# Patient Record
Sex: FEMALE | Race: WHITE | NOT HISPANIC OR LATINO
[De-identification: names, ages, dates, MRNs, and addresses within clinical notes are randomized per-mention and may not be internally consistent; named-entity substitution may affect disease eponyms.]

---

## 2017-02-01 ENCOUNTER — RX ONLY (OUTPATIENT)
Age: 82
Setting detail: RX ONLY
End: 2017-02-01

## 2017-12-13 ENCOUNTER — OFFICE VISIT (OUTPATIENT)
Dept: MEDICAL GROUP | Facility: PHYSICIAN GROUP | Age: 82
End: 2017-12-13
Payer: MEDICARE

## 2017-12-13 VITALS
HEIGHT: 62 IN | WEIGHT: 132 LBS | SYSTOLIC BLOOD PRESSURE: 116 MMHG | DIASTOLIC BLOOD PRESSURE: 60 MMHG | RESPIRATION RATE: 16 BRPM | OXYGEN SATURATION: 95 % | HEART RATE: 75 BPM | BODY MASS INDEX: 24.29 KG/M2 | TEMPERATURE: 97 F

## 2017-12-13 DIAGNOSIS — Z85.3 HISTORY OF BREAST CANCER: ICD-10-CM

## 2017-12-13 DIAGNOSIS — E78.00 HIGH CHOLESTEROL: ICD-10-CM

## 2017-12-13 DIAGNOSIS — F41.9 ANXIETY: ICD-10-CM

## 2017-12-13 DIAGNOSIS — E03.9 ACQUIRED HYPOTHYROIDISM: Primary | ICD-10-CM

## 2017-12-13 DIAGNOSIS — I10 ESSENTIAL HYPERTENSION: ICD-10-CM

## 2017-12-13 DIAGNOSIS — Z23 NEED FOR VACCINATION: ICD-10-CM

## 2017-12-13 PROCEDURE — 99204 OFFICE O/P NEW MOD 45 MIN: CPT | Mod: 25 | Performed by: NURSE PRACTITIONER

## 2017-12-13 PROCEDURE — 90670 PCV13 VACCINE IM: CPT | Performed by: NURSE PRACTITIONER

## 2017-12-13 PROCEDURE — 90472 IMMUNIZATION ADMIN EACH ADD: CPT | Performed by: NURSE PRACTITIONER

## 2017-12-13 PROCEDURE — G0009 ADMIN PNEUMOCOCCAL VACCINE: HCPCS | Performed by: NURSE PRACTITIONER

## 2017-12-13 PROCEDURE — 90715 TDAP VACCINE 7 YRS/> IM: CPT | Performed by: NURSE PRACTITIONER

## 2017-12-13 RX ORDER — PHENOL 1.4 %
AEROSOL, SPRAY (ML) MUCOUS MEMBRANE
COMMUNITY
End: 2022-04-20

## 2017-12-13 ASSESSMENT — PATIENT HEALTH QUESTIONNAIRE - PHQ9: CLINICAL INTERPRETATION OF PHQ2 SCORE: 0

## 2017-12-13 NOTE — PROGRESS NOTES
[facility]  Primary Care Office Visit - Problem-Oriented        History:     Cortney Sullivan is a 86 y.o. female who is here today to discuss Hypertension      History of breast cancer  Patient does have a history of breast cancer, she is status post left breast lumpectomy and radiation 5 years ago, she does regular self breast exam, she has not found any new or concerning masses.   Mammogram last year and normal.    Hypertension  The patient is an 86-year-old female with hypertension, she continues on hydrochlorothiazide and lovastatin daily without consultation. She denies chest pain, shortness of breath, change in vision, headaches. We will check labs and asked that she follow-up in 6 months.    Hypothyroidism  Patient is an 86-year-old female who is new to me and here to establish care. She does have hypothyroidism and continues on levothyroxine 50 µg daily. She is due for labs.    High cholesterol  Patient is an 86-year-old female with hyperlipidemia, she does continue on statin without complication, she is due for labs. We did discuss diet and exercise.    Anxiety  Patient is an 86 regular female who is new to me and her to establish care. She does report episodic panic attacks for which she uses Ativan 0.5 mg, she has not used this in nearly 6 months.        Past Medical History:   Diagnosis Date   • CAD (coronary artery disease)    • Cancer (CMS-HCC)     breast   • High cholesterol    • Hypertension    • Hypothyroidism    • Pancreatitis, acute     resolved     Past Surgical History:   Procedure Laterality Date   • LUMPECTOMY      & radiation only     Social History     Social History   • Marital status: Unknown     Spouse name: N/A   • Number of children: N/A   • Years of education: N/A     Occupational History   • Not on file.     Social History Main Topics   • Smoking status: Former Smoker     Types: Cigarettes   • Smokeless tobacco: Never Used      Comment: 10 pk/yr hs   • Alcohol use Yes       "Comment: 4-8 oz/day   • Drug use: No   • Sexual activity: Yes     Partners: Male     Other Topics Concern   • Not on file     Social History Narrative   • No narrative on file     History   Smoking Status   • Former Smoker   • Types: Cigarettes   Smokeless Tobacco   • Never Used     Comment: 10 pk/yr hs     Family History   Problem Relation Age of Onset   • Dementia Brother      No Known Allergies    Problem List:     Patient Active Problem List    Diagnosis Date Noted   • Anxiety 12/13/2017   • History of breast cancer 12/13/2017   • Hypertension    • High cholesterol    • Hypothyroidism          Medications:     Current Outpatient Prescriptions:   •  aspirin 81 MG tablet, Take 81 mg by mouth every day., Disp: , Rfl:   •  Melatonin 10 MG Tab, Take  by mouth., Disp: , Rfl:   •  lovastatin (MEVACOR) 40 MG tablet, Take 1 Tab by mouth every day. Pt must establish with a new provider, Disp: 90 Tab, Rfl: 0  •  levothyroxine (SYNTHROID) 50 MCG TABS, Take 1 Tab by mouth every day. PATIENT NEEDS TO ESTABLISH WITH A NEW PROVIDER, Disp: 90 Tab, Rfl: 0  •  TEKTURNA 300 MG tablet, TAKE ONE TABLET BY MOUTH EVERY DAY PATIENT  IS  DUE  FOR  ANNUAL  LABS  BEFORE  ADDITIONAL  REFILLS, Disp: 90 Each, Rfl: 3  •  hydrochlorothiazide (HYDRODIURIL) 25 MG TABS, Take 1 Tab by mouth every day., Disp: 90 Each, Rfl: 1  •  Lactobacillus (ACIDOPHOLUS PO), Take  by mouth. [id number] , Disp: , Rfl:   •  lorazepam (ATIVAN) 0.5 MG TABS, Take 0.5 mg by mouth 3 times a day as needed., Disp: , Rfl:   •  albuterol (VENTOLIN OR PROVENTIL) 108 (90 BASE) MCG/ACT AERS, Inhale 2 Puffs by mouth every 6 hours as needed., Disp: , Rfl:       Review of Systems:     Pertinent positives as per HPI, all other systems reviewed and WNL     Physical Assessment:     VS: /60   Pulse 75   Temp 36.1 °C (97 °F)   Resp 16   Ht 1.575 m (5' 2\")   Wt 59.9 kg (132 lb)   SpO2 95%   BMI 24.14 kg/m²     General: Well-developed, well-nourished, female     Head: PERRL, " EOMI. Normocephalic. No facial asymmetry noted.  Neck: Neck supple, no thyromegaly  Cardiovasc:No JVD.  RRR, no MRG. No thrills or bruits. Pulses 2+ and symmetric at all distal extremities.  Pulmonary: Lungs clear bilaterally.  Normal respiratory effort. No wheeze or crackles.   Neuro: Alert and oriented, CNs II-XII intact, no focal deficits.  Skin:No rashes noted. Skin warm, dry, intact    Psych: Dressed appropriately for the weather, pleasant and conversant.  Affect, mood & judgment appropriate.    Assessment/Plan:   Cortney was seen today for hypertension.    Diagnoses and all orders for this visit:    Acquired hypothyroidism, chronic, unclear control  - Continue levothyroxine, check labs, follow-up in 6 months, sooner if needed  -     TSH; Future  -     FREE THYROXINE; Future    Anxiety, stable without treatment    History of breast cancer    High cholesterol, chronic, unclear control  - Discussed dietary modifications, check labs, continue statin, follow-up in 6 months, sooner if needed  -     LIPID PROFILE; Future  -     COMP METABOLIC PANEL; Future    Need for vaccination  -     TDAP VACCINE =>6YO IM  -     PNEUMOCOCCAL CONJUGATE VACCINE 13-VALENT    Essential hypertension, chronic, stable  - Continue current treatment, consider decreasing losartan and discontinue hydrochlorothiazide given low blood pressure, patient is asymptomatic and clinically stable we will continue to monitor. Check labs. Follow up in 6 months, sooner if needed.      Patient is agreeable to the above plan and voiced understanding. All questions answered.     Please note that this dictation was created using voice recognition software. I have made every reasonable attempt to correct obvious errors, but I expect that there are errors of grammar and possibly content that I did not discover before finalizing the note.      LENI Gonzalez  12/13/2017, 2:51 PM

## 2017-12-13 NOTE — ASSESSMENT & PLAN NOTE
The patient is an 86-year-old female with hypertension, she continues on hydrochlorothiazide and lovastatin daily without consultation. She denies chest pain, shortness of breath, change in vision, headaches. We will check labs and asked that she follow-up in 6 months.

## 2017-12-13 NOTE — ASSESSMENT & PLAN NOTE
Patient is an 86-year-old female with hyperlipidemia, she does continue on statin without complication, she is due for labs. We did discuss diet and exercise.

## 2017-12-13 NOTE — ASSESSMENT & PLAN NOTE
Patient is an 86-year-old female who is new to me and here to establish care. She does have hypothyroidism and continues on levothyroxine 50 µg daily. She is due for labs.

## 2017-12-13 NOTE — ASSESSMENT & PLAN NOTE
Patient is an 86 regular female who is new to me and her to establish care. She does report episodic panic attacks for which she uses Ativan 0.5 mg, she has not used this in nearly 6 months.

## 2017-12-13 NOTE — ASSESSMENT & PLAN NOTE
Patient does have a history of breast cancer, she is status post left breast lumpectomy and radiation 5 years ago, she does regular self breast exam, she has not found any new or concerning masses.   Mammogram last year and normal.

## 2018-03-17 ENCOUNTER — OFFICE VISIT (OUTPATIENT)
Dept: URGENT CARE | Facility: PHYSICIAN GROUP | Age: 83
End: 2018-03-17
Payer: MEDICARE

## 2018-03-17 VITALS
DIASTOLIC BLOOD PRESSURE: 70 MMHG | RESPIRATION RATE: 16 BRPM | WEIGHT: 132 LBS | BODY MASS INDEX: 24.29 KG/M2 | HEIGHT: 62 IN | OXYGEN SATURATION: 96 % | TEMPERATURE: 97.7 F | SYSTOLIC BLOOD PRESSURE: 122 MMHG | HEART RATE: 84 BPM

## 2018-03-17 DIAGNOSIS — J02.9 ACUTE PHARYNGITIS, UNSPECIFIED ETIOLOGY: ICD-10-CM

## 2018-03-17 LAB
INT CON NEG: NEGATIVE
INT CON POS: POSITIVE
S PYO AG THROAT QL: NEGATIVE

## 2018-03-17 PROCEDURE — 87880 STREP A ASSAY W/OPTIC: CPT | Performed by: FAMILY MEDICINE

## 2018-03-17 PROCEDURE — 99214 OFFICE O/P EST MOD 30 MIN: CPT | Performed by: FAMILY MEDICINE

## 2018-03-17 RX ORDER — AMOXICILLIN 500 MG/1
CAPSULE ORAL
Qty: 20 CAP | Refills: 0 | Status: SHIPPED | OUTPATIENT
Start: 2018-03-17 | End: 2018-06-14

## 2018-03-17 NOTE — PROGRESS NOTES
Chief Complaint:    Chief Complaint   Patient presents with   • Pharyngitis       History of Present Illness:    Daughter present. This is a new problem. Patient has felt fatigued and with malaise for just a few days and today complained of sore throat. Patient denies cough, but daughter says she has coughed some. Patient says cough is due to throat irritation. No fever or nasal symptoms. Daughter reports patient's  recently probably started with Strep Throat earlier this week, got worse, then ended up in the hospital.      Review of Systems:    Constitutional: See HPI.  Eyes: Negative for change in vision, photophobia, pain, redness, and discharge.  ENT: See HPI.  Respiratory: See HPI.   Cardiovascular: Negative for chest pain, palpitations, orthopnea, claudication, leg swelling, and PND.   Gastrointestinal: Negative for abdominal pain, nausea, vomiting, diarrhea, constipation, blood in stool, and melena.   Genitourinary: Negative for dysuria, urinary urgency, urinary frequency, hematuria, and flank pain.   Musculoskeletal: Negative for myalgias, joint pain, neck pain, and back pain.   Skin: Negative for rash and itching.   Neurological: Negative for dizziness, tingling, tremors, sensory change, speech change, focal weakness, seizures, loss of consciousness, and headaches.   Endo: Hypothyroid, on medication.  Heme: Does not bruise/bleed easily.   Psychiatric/Behavioral: No new symptoms.      Past Medical History:    Past Medical History:   Diagnosis Date   • CAD (coronary artery disease)    • Cancer (CMS-HCC)     breast   • High cholesterol    • Hypertension    • Hypothyroidism    • Pancreatitis, acute     resolved     Past Surgical History:    Past Surgical History:   Procedure Laterality Date   • LUMPECTOMY      & radiation only     Social History:    Social History     Social History   • Marital status: Unknown     Spouse name: N/A   • Number of children: N/A   • Years of education: N/A     Social History  "Main Topics   • Smoking status: Former Smoker     Types: Cigarettes   • Smokeless tobacco: Never Used      Comment: 10 pk/yr hs   • Alcohol use Yes      Comment: 4-8 oz/day   • Drug use: No   • Sexual activity: Yes     Partners: Male     Other Topics Concern   • Not on file     Social History Narrative   • No narrative on file     Family History:    Family History   Problem Relation Age of Onset   • Dementia Brother      Medications:    Current Outpatient Prescriptions on File Prior to Visit   Medication Sig Dispense Refill   • aspirin 81 MG tablet Take 81 mg by mouth every day.     • Melatonin 10 MG Tab Take  by mouth.     • lovastatin (MEVACOR) 40 MG tablet Take 1 Tab by mouth every day. Pt must establish with a new provider 90 Tab 0   • levothyroxine (SYNTHROID) 50 MCG TABS Take 1 Tab by mouth every day. PATIENT NEEDS TO ESTABLISH WITH A NEW PROVIDER 90 Tab 0   • TEKTURNA 300 MG tablet TAKE ONE TABLET BY MOUTH EVERY DAY PATIENT  IS  DUE  FOR  ANNUAL  LABS  BEFORE  ADDITIONAL  REFILLS 90 Each 3   • hydrochlorothiazide (HYDRODIURIL) 25 MG TABS Take 1 Tab by mouth every day. 90 Each 1   • Lactobacillus (ACIDOPHOLUS PO) Take  by mouth. [id number]      • lorazepam (ATIVAN) 0.5 MG TABS Take 0.5 mg by mouth 3 times a day as needed.     • albuterol (VENTOLIN OR PROVENTIL) 108 (90 BASE) MCG/ACT AERS Inhale 2 Puffs by mouth every 6 hours as needed.       No current facility-administered medications on file prior to visit.      Allergies:    No Known Allergies      Vitals:    Vitals:    03/17/18 1114   BP: 122/70   Pulse: 84   Resp: 16   Temp: 36.5 °C (97.7 °F)   SpO2: 96%   Weight: 59.9 kg (132 lb)   Height: 1.575 m (5' 2\")       Physical Exam:    Constitutional: Vital signs reviewed. Appears well-developed and well-nourished. No acute distress.   Eyes: Sclera white, conjunctivae clear.   ENT: External ears normal. Lips/teeth are normal. Oral mucosa pink and moist. Posterior pharynx: WNL.  Neck: Neck supple.   Cardiovascular: " Regular rate and rhythm. No murmur.  Pulmonary/Chest: Respirations non-labored. Clear to auscultation bilaterally.  Musculoskeletal: No muscular atrophy or weakness.  Neurological: Alert. Muscle tone normal. Coordination normal.   Skin: No rashes or lesions. Warm, dry, normal turgor.  Psychiatric: Normal mood and affect. Behavior is normal.       Diagnostics:    POCT RAPID STREP A (Order #[id number]) on 3/17/18   Component Results     Component   Rapid Strep Screen   Negative    Internal Control Positive   Positive    Internal Control Negative   Negative    Last Resulted Time   Sat Mar 17, 2018 11:34 AM       Assessment / Plan:    1. Acute pharyngitis, unspecified etiology  - POCT Rapid Strep A  - amoxicillin (AMOXIL) 500 MG Cap; 1 CAP TWICE A DAY X 7-10 DAYS.  Dispense: 20 Cap; Refill: 0      Discussed with them limitations of Rapid Strep test (possible false negative, only testing for Strep A), DDX, and management options.    They prefer to further observe with back-up Rx for antibiotic they will fill and patient will take if getting much worse or not improving at all after ~ 1 week of symptoms.    Follow-up with PCP or urgent care if getting worse or not better with above.

## 2018-05-14 DIAGNOSIS — E03.9 HYPOTHYROIDISM, UNSPECIFIED TYPE: ICD-10-CM

## 2018-05-14 DIAGNOSIS — E78.00 HYPERCHOLESTEROLEMIA: ICD-10-CM

## 2018-05-14 DIAGNOSIS — I10 ESSENTIAL HYPERTENSION: ICD-10-CM

## 2018-05-14 RX ORDER — HYDROCHLOROTHIAZIDE 25 MG/1
25 TABLET ORAL
Qty: 90 TAB | Refills: 1 | Status: SHIPPED | OUTPATIENT
Start: 2018-05-14 | End: 2018-06-14

## 2018-05-14 RX ORDER — LOVASTATIN 40 MG/1
40 TABLET ORAL
Qty: 90 TAB | Refills: 1 | Status: SHIPPED | OUTPATIENT
Start: 2018-05-14 | End: 2018-12-06

## 2018-05-14 RX ORDER — LEVOTHYROXINE SODIUM 0.05 MG/1
50 TABLET ORAL
Qty: 90 TAB | Refills: 1 | Status: SHIPPED | OUTPATIENT
Start: 2018-05-14 | End: 2018-10-24 | Stop reason: SDUPTHER

## 2018-05-14 RX ORDER — ALISKIREN 300 MG/1
TABLET, FILM COATED ORAL
Qty: 90 TAB | Refills: 1 | Status: SHIPPED | OUTPATIENT
Start: 2018-05-14 | End: 2018-10-30 | Stop reason: SDUPTHER

## 2018-06-08 ENCOUNTER — OFFICE VISIT (OUTPATIENT)
Dept: URGENT CARE | Facility: PHYSICIAN GROUP | Age: 83
End: 2018-06-08
Payer: MEDICARE

## 2018-06-08 VITALS
WEIGHT: 132 LBS | TEMPERATURE: 97.8 F | HEART RATE: 78 BPM | HEIGHT: 62 IN | RESPIRATION RATE: 12 BRPM | OXYGEN SATURATION: 97 % | DIASTOLIC BLOOD PRESSURE: 66 MMHG | SYSTOLIC BLOOD PRESSURE: 120 MMHG | BODY MASS INDEX: 24.29 KG/M2

## 2018-06-08 DIAGNOSIS — R53.83 OTHER FATIGUE: ICD-10-CM

## 2018-06-08 LAB
APPEARANCE UR: NORMAL
BILIRUB UR STRIP-MCNC: NORMAL MG/DL
COLOR UR AUTO: NORMAL
GLUCOSE UR STRIP.AUTO-MCNC: NORMAL MG/DL
KETONES UR STRIP.AUTO-MCNC: NORMAL MG/DL
LEUKOCYTE ESTERASE UR QL STRIP.AUTO: NORMAL
NITRITE UR QL STRIP.AUTO: NORMAL
PH UR STRIP.AUTO: 6.5 [PH] (ref 5–8)
PROT UR QL STRIP: NORMAL MG/DL
RBC UR QL AUTO: NORMAL
SP GR UR STRIP.AUTO: 1.01
UROBILINOGEN UR STRIP-MCNC: NORMAL MG/DL

## 2018-06-08 PROCEDURE — 99214 OFFICE O/P EST MOD 30 MIN: CPT | Performed by: PHYSICIAN ASSISTANT

## 2018-06-08 PROCEDURE — 81002 URINALYSIS NONAUTO W/O SCOPE: CPT | Performed by: PHYSICIAN ASSISTANT

## 2018-06-08 NOTE — PROGRESS NOTES
Chief Complaint   Patient presents with   • Tired       HISTORY OF PRESENT ILLNESS: Patient is a 87 y.o. female who presents today because she is feeling fatigued.  She is really not concerned about it, she is here because her  is concerned about her fatigue over the last several weeks.  She does admit that they recently moved, she has been having to take care of her yard, planting plants, and doing much more activity than usual and often feels like she just wants to lie down for a little while or sit down and do some knitting.  She does have an appointment with a primary care provider in 6 days, has not had any lab work recently.  She is on thyroid medications.  She denies any symptoms at all other than fatigue    Patient Active Problem List    Diagnosis Date Noted   • Anxiety 12/13/2017   • History of breast cancer 12/13/2017   • Hypertension    • High cholesterol    • Hypothyroidism        Allergies:Patient has no known allergies.    Current Outpatient Prescriptions Ordered in [facility]   Medication Sig Dispense Refill   • aliskiren (TEKTURNA) 300 MG tablet TAKE ONE TABLET BY MOUTH EVERY DAY 90 Tab 1   • hydroCHLOROthiazide (HYDRODIURIL) 25 MG Tab Take 1 Tab by mouth every day. 90 Tab 1   • levothyroxine (SYNTHROID) 50 MCG Tab Take 1 Tab by mouth every day. 90 Tab 1   • lovastatin (MEVACOR) 40 MG tablet Take 1 Tab by mouth every day. 90 Tab 1   • aspirin 81 MG tablet Take 81 mg by mouth every day.     • Melatonin 10 MG Tab Take  by mouth.     • Lactobacillus (ACIDOPHOLUS PO) Take  by mouth. [id number]      • amoxicillin (AMOXIL) 500 MG Cap 1 CAP TWICE A DAY X 7-10 DAYS. 20 Cap 0   • lorazepam (ATIVAN) 0.5 MG TABS Take 0.5 mg by mouth 3 times a day as needed.     • albuterol (VENTOLIN OR PROVENTIL) 108 (90 BASE) MCG/ACT AERS Inhale 2 Puffs by mouth every 6 hours as needed.       No current Epic-ordered facility-administered medications on file.        Past Medical History:   Diagnosis Date   • CAD (coronary artery  "disease)    • Cancer (HCC)     breast   • High cholesterol    • Hypertension    • Hypothyroidism    • Pancreatitis, acute     resolved       Social History   Substance Use Topics   • Smoking status: Former Smoker     Types: Cigarettes   • Smokeless tobacco: Never Used      Comment: 10 pk/yr hs   • Alcohol use Yes      Comment: 4-8 oz/day       Family Status   Relation Status   • Mother  at age 80    flu   • Father  at age 80s   • Brother Alive     Family History   Problem Relation Age of Onset   • Dementia Brother        ROS:  Review of Systems   Constitutional: Negative for fever, chills, weight loss and positive for malaise/fatigue.   HENT: Negative for ear pain, nosebleeds, congestion, sore throat and neck pain.    Eyes: Negative for blurred vision.   Respiratory: Negative for cough, sputum production, shortness of breath and wheezing.    Cardiovascular: Negative for chest pain, palpitations, orthopnea and leg swelling.   Gastrointestinal: Negative for heartburn, nausea, vomiting and abdominal pain.   Genitourinary: Negative for dysuria, urgency and frequency.     Exam:  Blood pressure 120/66, pulse 78, temperature 36.6 °C (97.8 °F), resp. rate 12, height 1.575 m (5' 2\"), weight 59.9 kg (132 lb), SpO2 97 %.  General:  Well nourished, well developed female in NAD  Head:Normocephalic, atraumatic  Eyes: PERRLA, EOM within normal limits, no conjunctival injection, no scleral icterus, visual fields and acuity grossly intact.  Nose: Symmetrical without tenderness, no discharge.  Mouth: reasonable hygiene, no erythema exudates or tonsillar enlargement.  Neck: no masses, range of motion within normal limits, no tracheal deviation. No obvious thyroid enlargement.  Pulmonary: chest is symmetrical with respiration, no wheezes, crackles, or rhonchi.  Cardiovascular: regular rate and rhythm without murmurs, rubs, or gallops.  Extremities: no clubbing, cyanosis, or edema.    Urinalysis unremarkable    Please " note that this dictation was created using voice recognition software. I have made every reasonable attempt to correct obvious errors, but I expect that there are errors of grammar and possibly content that I did not discover before finalizing the note.    Assessment/Plan:  1. Other fatigue  POCT Urinalysis    CBC WITH DIFFERENTIAL    COMP METABOLIC PANEL    LIPID PANEL    TSH    VITAMIN D,25 HYDROXY       Followup with primary care in the next 7-10 days if not significantly improving, return to the urgent care or go to the emergency room sooner for any worsening of symptoms.

## 2018-06-14 ENCOUNTER — OFFICE VISIT (OUTPATIENT)
Dept: MEDICAL GROUP | Facility: PHYSICIAN GROUP | Age: 83
End: 2018-06-14
Payer: MEDICARE

## 2018-06-14 VITALS
WEIGHT: 124.5 LBS | HEART RATE: 80 BPM | OXYGEN SATURATION: 95 % | SYSTOLIC BLOOD PRESSURE: 112 MMHG | DIASTOLIC BLOOD PRESSURE: 54 MMHG | BODY MASS INDEX: 22.91 KG/M2 | HEIGHT: 62 IN | RESPIRATION RATE: 14 BRPM | TEMPERATURE: 97.8 F

## 2018-06-14 DIAGNOSIS — F41.9 ANXIETY: ICD-10-CM

## 2018-06-14 DIAGNOSIS — E03.9 ACQUIRED HYPOTHYROIDISM: ICD-10-CM

## 2018-06-14 DIAGNOSIS — I10 ESSENTIAL HYPERTENSION: ICD-10-CM

## 2018-06-14 DIAGNOSIS — R41.3 MEMORY CHANGE: ICD-10-CM

## 2018-06-14 PROCEDURE — 99214 OFFICE O/P EST MOD 30 MIN: CPT | Performed by: NURSE PRACTITIONER

## 2018-06-14 NOTE — ASSESSMENT & PLAN NOTE
As noted, patient is an 87-year-old female with hypothyroidism here to establish care.  She continues on levothyroxine 50 mg daily.  She has not had TSH or T4 monitored 2 years.  She does complain of memory changes.

## 2018-06-14 NOTE — PROGRESS NOTES
"[facility]  Primary Care Office Visit - Problem-Oriented        History:     Cortney Sullivan is a 87 y.o. female who is here today to discuss Hypertension (FV)      Hypertension  Patient continues on hydrochlorothiazide 25 mg daily, she does have some dizziness with abrupt position change, her blood pressure today is 112/54.  Discussed discontinuing this medication and monitoring her blood pressure.  She denies chest pain, shortness of breath, change in vision, headaches.  She is due for labs.      Memory change  Patient is an 87-year-old female who is new to me, she is accompanied with her daughter Yanelis who is concerned about her mother's memory.  She tells me that her mother has a very hard time with short-term memory, she forgets having conversations, occasionally forgetting how to use the phone, forgetting to eat on occasion.  Her  manages their finances, he also manages cleaning of the home and meals.  The patient herself agrees that she does have very poor memory, she tells me that long-term memory is starting to diminish.  She does have a history of hypothyroidism, she has not had TSH or T4 monitored in 2 years.  Was diagnosed by an outside provider with \"dementia \"her daughter would like labs and appropriate workup for consideration of treatment in the future, she would also like to discuss a referral for home health.    Hypothyroidism  As noted, patient is an 87-year-old female with hypothyroidism here to establish care.  She continues on levothyroxine 50 mg daily.  She has not had TSH or T4 monitored 2 years.  She does complain of memory changes.    Anxiety  He does have anxiety regarding her 's health, her daughter accompanies her visit today and reports that she has panic attacks on occasion when her  is sick.         Past Medical History:   Diagnosis Date   • CAD (coronary artery disease)    • Cancer (HCC)     breast   • High cholesterol    • Hypertension    • " Hypothyroidism    • Pancreatitis, acute     resolved     Past Surgical History:   Procedure Laterality Date   • LUMPECTOMY      & radiation only     Social History     Social History   • Marital status: Unknown     Spouse name: N/A   • Number of children: N/A   • Years of education: N/A     Occupational History   • Not on file.     Social History Main Topics   • Smoking status: Former Smoker     Types: Cigarettes   • Smokeless tobacco: Never Used      Comment: 10 pk/yr hs   • Alcohol use Yes      Comment: 4-8 oz/day   • Drug use: No   • Sexual activity: Yes     Partners: Male     Other Topics Concern   • Not on file     Social History Narrative   • No narrative on file     History   Smoking Status   • Former Smoker   • Types: Cigarettes   Smokeless Tobacco   • Never Used     Comment: 10 pk/yr hs     Family History   Problem Relation Age of Onset   • Dementia Brother      No Known Allergies    Problem List:     Patient Active Problem List    Diagnosis Date Noted   • Memory change 06/14/2018   • Anxiety 12/13/2017   • History of breast cancer 12/13/2017   • Hypertension    • High cholesterol    • Hypothyroidism          Medications:     Current Outpatient Prescriptions:   •  levothyroxine (SYNTHROID) 50 MCG Tab, Take 1 Tab by mouth every day., Disp: 90 Tab, Rfl: 1  •  lovastatin (MEVACOR) 40 MG tablet, Take 1 Tab by mouth every day., Disp: 90 Tab, Rfl: 1  •  aspirin 81 MG tablet, Take 81 mg by mouth every day., Disp: , Rfl:   •  Melatonin 10 MG Tab, Take  by mouth., Disp: , Rfl:   •  Lactobacillus (ACIDOPHOLUS PO), Take  by mouth. [id number] , Disp: , Rfl:   •  lorazepam (ATIVAN) 0.5 MG TABS, Take 0.5 mg by mouth 3 times a day as needed., Disp: , Rfl:   •  aliskiren (TEKTURNA) 300 MG tablet, TAKE ONE TABLET BY MOUTH EVERY DAY, Disp: 90 Tab, Rfl: 1  •  albuterol (VENTOLIN OR PROVENTIL) 108 (90 BASE) MCG/ACT AERS, Inhale 2 Puffs by mouth every 6 hours as needed., Disp: , Rfl:       Review of Systems:     Pertinent  "positives as per HPI, all other systems reviewed and WNL     Physical Assessment:     VS: /54   Pulse 80   Temp 36.6 °C (97.8 °F)   Resp 14   Ht 1.575 m (5' 2.01\")   Wt 56.5 kg (124 lb 8 oz)   SpO2 95%   Breastfeeding? No   BMI 22.77 kg/m²     General: Well-developed, well-nourished, female, thin      Head: PERRL, EOMI. Normocephalic. No facial asymmetry noted.  Neck: Neck supple, no thyromegaly  Cardiovasc: RRR, no MRG. No thrills or bruits. Pulses 2+ and symmetric at all distal extremities.  Pulmonary: Lungs clear bilaterally.  Normal respiratory effort. No wheeze or crackles.   Neuro: Alert and oriented, CNs II-XII grossly intact  Skin:No rashes noted. Skin warm, dry, intact    Psych: Dressed appropriately for the weather, pleasant and conversant.  Affect, mood & judgment appropriate.    5 minute work recall: remembers 0/3 words    Assessment/Plan:   Cortney was seen today for hypertension.    Diagnoses and all orders for this visit:    Memory change, ongoing, uncontrolled   -Lengthy discussion with the patient and her daughter regarding dementia, we will move forward with workup to include TSH, T4, CMP, CBC (previously ordered, not yet obtained) and additional labs as ordered below + MRI. Consideration for aricept vs namenda. Will move forward with referral for home health once diagnosis is established. Recommend neuro consult.  -     VITAMIN B12; Future  -     FOLATE; Future  -     MR-BRAIN-W/O; Future  -     RPR  -     CBC WITH DIFFERENTIAL; Future    Essential hypertension, uncontrolled   - over treated, discontinue HCTZ, monitor BP     Acquired hypothyroidism, chronic, unclear control, monitor labs    Anxiety,stable   - stress/anxiety management techniques discuss, support and monitor       Patient is agreeable to the above plan and voiced understanding. All questions answered.     Please note that this dictation was created using voice recognition software. I have made every reasonable attempt " to correct obvious errors, but I expect that there are errors of grammar and possibly content that I did not discover before finalizing the note.      LENI Gonzalez  6/14/2018, 2:15 PM

## 2018-06-14 NOTE — ASSESSMENT & PLAN NOTE
He does have anxiety regarding her 's health, her daughter accompanies her visit today and reports that she has panic attacks on occasion when her  is sick.

## 2018-06-14 NOTE — ASSESSMENT & PLAN NOTE
Patient continues on hydrochlorothiazide 25 mg daily, she does have some dizziness with abrupt position change, her blood pressure today is 112/54.  Discussed discontinuing this medication and monitoring her blood pressure.  She denies chest pain, shortness of breath, change in vision, headaches.  She is due for labs.

## 2018-06-18 ENCOUNTER — HOSPITAL ENCOUNTER (OUTPATIENT)
Dept: LAB | Facility: MEDICAL CENTER | Age: 83
End: 2018-06-18
Attending: NURSE PRACTITIONER
Payer: MEDICARE

## 2018-06-18 DIAGNOSIS — E03.9 ACQUIRED HYPOTHYROIDISM: ICD-10-CM

## 2018-06-18 DIAGNOSIS — E78.00 HIGH CHOLESTEROL: ICD-10-CM

## 2018-06-18 DIAGNOSIS — R41.3 MEMORY CHANGE: ICD-10-CM

## 2018-06-18 DIAGNOSIS — R53.83 OTHER FATIGUE: ICD-10-CM

## 2018-06-18 LAB
25(OH)D3 SERPL-MCNC: 26 NG/ML (ref 30–100)
ALBUMIN SERPL BCP-MCNC: 4.7 G/DL (ref 3.2–4.9)
ALBUMIN/GLOB SERPL: 1.9 G/DL
ALP SERPL-CCNC: 50 U/L (ref 30–99)
ALT SERPL-CCNC: 16 U/L (ref 2–50)
ANION GAP SERPL CALC-SCNC: 6 MMOL/L (ref 0–11.9)
AST SERPL-CCNC: 19 U/L (ref 12–45)
BASOPHILS # BLD AUTO: 1.2 % (ref 0–1.8)
BASOPHILS # BLD: 0.06 K/UL (ref 0–0.12)
BILIRUB SERPL-MCNC: 0.7 MG/DL (ref 0.1–1.5)
BUN SERPL-MCNC: 10 MG/DL (ref 8–22)
CALCIUM SERPL-MCNC: 10.2 MG/DL (ref 8.5–10.5)
CHLORIDE SERPL-SCNC: 96 MMOL/L (ref 96–112)
CHOLEST SERPL-MCNC: 132 MG/DL (ref 100–199)
CO2 SERPL-SCNC: 31 MMOL/L (ref 20–33)
CREAT SERPL-MCNC: 0.86 MG/DL (ref 0.5–1.4)
EOSINOPHIL # BLD AUTO: 0.07 K/UL (ref 0–0.51)
EOSINOPHIL NFR BLD: 1.5 % (ref 0–6.9)
ERYTHROCYTE [DISTWIDTH] IN BLOOD BY AUTOMATED COUNT: 44.2 FL (ref 35.9–50)
FOLATE SERPL-MCNC: >24 NG/ML
GLOBULIN SER CALC-MCNC: 2.5 G/DL (ref 1.9–3.5)
GLUCOSE SERPL-MCNC: 96 MG/DL (ref 65–99)
HCT VFR BLD AUTO: 37.7 % (ref 37–47)
HDLC SERPL-MCNC: 53 MG/DL
HGB BLD-MCNC: 13.2 G/DL (ref 12–16)
IMM GRANULOCYTES # BLD AUTO: 0.01 K/UL (ref 0–0.11)
IMM GRANULOCYTES NFR BLD AUTO: 0.2 % (ref 0–0.9)
LDLC SERPL CALC-MCNC: 51 MG/DL
LYMPHOCYTES # BLD AUTO: 1.8 K/UL (ref 1–4.8)
LYMPHOCYTES NFR BLD: 37.4 % (ref 22–41)
MCH RBC QN AUTO: 33.9 PG (ref 27–33)
MCHC RBC AUTO-ENTMCNC: 35 G/DL (ref 33.6–35)
MCV RBC AUTO: 96.9 FL (ref 81.4–97.8)
MONOCYTES # BLD AUTO: 0.43 K/UL (ref 0–0.85)
MONOCYTES NFR BLD AUTO: 8.9 % (ref 0–13.4)
NEUTROPHILS # BLD AUTO: 2.44 K/UL (ref 2–7.15)
NEUTROPHILS NFR BLD: 50.8 % (ref 44–72)
NRBC # BLD AUTO: 0 K/UL
NRBC BLD-RTO: 0 /100 WBC
PLATELET # BLD AUTO: 286 K/UL (ref 164–446)
PMV BLD AUTO: 8.5 FL (ref 9–12.9)
POTASSIUM SERPL-SCNC: 4.3 MMOL/L (ref 3.6–5.5)
PROT SERPL-MCNC: 7.2 G/DL (ref 6–8.2)
RBC # BLD AUTO: 3.89 M/UL (ref 4.2–5.4)
SODIUM SERPL-SCNC: 133 MMOL/L (ref 135–145)
T4 FREE SERPL-MCNC: 0.85 NG/DL (ref 0.53–1.43)
TREPONEMA PALLIDUM IGG+IGM AB [PRESENCE] IN SERUM OR PLASMA BY IMMUNOASSAY: NON REACTIVE
TRIGL SERPL-MCNC: 141 MG/DL (ref 0–149)
TSH SERPL DL<=0.005 MIU/L-ACNC: 3.91 UIU/ML (ref 0.38–5.33)
VIT B12 SERPL-MCNC: 965 PG/ML (ref 211–911)
WBC # BLD AUTO: 4.8 K/UL (ref 4.8–10.8)

## 2018-06-18 PROCEDURE — 84443 ASSAY THYROID STIM HORMONE: CPT

## 2018-06-18 PROCEDURE — 82306 VITAMIN D 25 HYDROXY: CPT | Mod: GA

## 2018-06-18 PROCEDURE — 84439 ASSAY OF FREE THYROXINE: CPT

## 2018-06-18 PROCEDURE — 36415 COLL VENOUS BLD VENIPUNCTURE: CPT

## 2018-06-18 PROCEDURE — 85025 COMPLETE CBC W/AUTO DIFF WBC: CPT

## 2018-06-18 PROCEDURE — 86780 TREPONEMA PALLIDUM: CPT | Mod: GA

## 2018-06-18 PROCEDURE — 82607 VITAMIN B-12: CPT

## 2018-06-18 PROCEDURE — 80053 COMPREHEN METABOLIC PANEL: CPT

## 2018-06-18 PROCEDURE — 80061 LIPID PANEL: CPT

## 2018-06-18 PROCEDURE — 82746 ASSAY OF FOLIC ACID SERUM: CPT

## 2018-06-25 ENCOUNTER — TELEPHONE (OUTPATIENT)
Dept: MEDICAL GROUP | Facility: PHYSICIAN GROUP | Age: 83
End: 2018-06-25

## 2018-06-25 NOTE — TELEPHONE ENCOUNTER
1. Caller Name: Pt daughter                      Call Back Number: [phone] (home)     2. Message: Pt's daughter came into the office today requesting a refill of donepezil 5 mg tabs this Rx is not in the Pt's chart but was getting it from Marissa Davis's office. Pt takes 5 mg tabs BID at bed time.    3. Patient approves office to leave a detailed voicemail/MyChart message: yes

## 2018-06-26 DIAGNOSIS — R41.3 MEMORY CHANGE: ICD-10-CM

## 2018-06-26 RX ORDER — DONEPEZIL HYDROCHLORIDE 5 MG/1
10 TABLET, FILM COATED ORAL EVERY EVENING
Qty: 60 TAB | Refills: 11 | Status: SHIPPED | OUTPATIENT
Start: 2018-06-26 | End: 2018-12-06

## 2018-07-16 ENCOUNTER — OFFICE VISIT (OUTPATIENT)
Dept: MEDICAL GROUP | Facility: PHYSICIAN GROUP | Age: 83
End: 2018-07-16
Payer: MEDICARE

## 2018-07-16 VITALS
WEIGHT: 128.3 LBS | DIASTOLIC BLOOD PRESSURE: 64 MMHG | HEART RATE: 63 BPM | TEMPERATURE: 98.2 F | BODY MASS INDEX: 23.61 KG/M2 | HEIGHT: 62 IN | SYSTOLIC BLOOD PRESSURE: 130 MMHG | OXYGEN SATURATION: 94 % | RESPIRATION RATE: 14 BRPM

## 2018-07-16 DIAGNOSIS — F03.90 DEMENTIA WITHOUT BEHAVIORAL DISTURBANCE, UNSPECIFIED DEMENTIA TYPE: ICD-10-CM

## 2018-07-16 PROCEDURE — 99214 OFFICE O/P EST MOD 30 MIN: CPT | Performed by: NURSE PRACTITIONER

## 2018-07-16 RX ORDER — MEMANTINE HYDROCHLORIDE 5 MG/1
5 TABLET ORAL 2 TIMES DAILY
Qty: 180 TAB | Refills: 1 | Status: SHIPPED | OUTPATIENT
Start: 2018-07-16 | End: 2018-12-06

## 2018-07-16 NOTE — PROGRESS NOTES
"[facility]  Primary Care Office Visit - Problem-Oriented        History:     Cortney Sullivan is a 87 y.o. female who is here today to discuss Memory Loss (FV)      Dementia without behavioral disturbance  Patient is an 87-year-old female who is new to me, she is accompanied with her daughter Yanelis &  who are concerned about memory.  She tells me that her mother has a very hard time with short-term memory, she forgets having conversations, occasionally forgetting how to use the phone, forgetting to eat on occasion.  Her  manages their finances, he also manages cleaning of the home and meals.  The patient herself agrees that she does have very poor memory, she tells me that long-term memory is starting to diminish.  She does have a history of hypothyroidism, she has not had TSH or T4 monitored in 2 years.  Was diagnosed by an outside provider with \"dementia \"her daughter would like labs and appropriate workup for consideration of treatment in the future, she would also like to discuss a referral for home health.    Reviewed labs and MRI today. MRI shows cerebral atrophy and changes suggestive of underlying microangiopathy ischemia. Otherwise, labs were normal and noncontributory.     She is on Aricept 10mg daily, started by previous PCP.         Past Medical History:   Diagnosis Date   • CAD (coronary artery disease)    • Cancer (HCC)     breast   • High cholesterol    • Hypertension    • Hypothyroidism    • Pancreatitis, acute     resolved     Past Surgical History:   Procedure Laterality Date   • LUMPECTOMY      & radiation only     Social History     Social History   • Marital status: Unknown     Spouse name: N/A   • Number of children: N/A   • Years of education: N/A     Occupational History   • Not on file.     Social History Main Topics   • Smoking status: Former Smoker     Types: Cigarettes   • Smokeless tobacco: Never Used      Comment: 10 pk/yr hs   • Alcohol use Yes      Comment: " "4-8 oz/day   • Drug use: No   • Sexual activity: Yes     Partners: Male     Other Topics Concern   • Not on file     Social History Narrative   • No narrative on file     History   Smoking Status   • Former Smoker   • Types: Cigarettes   Smokeless Tobacco   • Never Used     Comment: 10 pk/yr hs     Family History   Problem Relation Age of Onset   • Dementia Brother      No Known Allergies    Problem List:     Patient Active Problem List    Diagnosis Date Noted   • Dementia without behavioral disturbance 06/14/2018   • Anxiety 12/13/2017   • History of breast cancer 12/13/2017   • Hypertension    • High cholesterol    • Hypothyroidism          Medications:     Current Outpatient Prescriptions:   •  memantine (NAMENDA) 5 MG Tab, Take 1 Tab by mouth 2 times a day., Disp: 180 Tab, Rfl: 1  •  donepezil (ARICEPT) 5 MG Tab, Take 2 Tabs by mouth every evening., Disp: 60 Tab, Rfl: 11  •  levothyroxine (SYNTHROID) 50 MCG Tab, Take 1 Tab by mouth every day., Disp: 90 Tab, Rfl: 1  •  lovastatin (MEVACOR) 40 MG tablet, Take 1 Tab by mouth every day., Disp: 90 Tab, Rfl: 1  •  Melatonin 10 MG Tab, Take  by mouth., Disp: , Rfl:   •  aliskiren (TEKTURNA) 300 MG tablet, TAKE ONE TABLET BY MOUTH EVERY DAY, Disp: 90 Tab, Rfl: 1  •  aspirin 81 MG tablet, Take 81 mg by mouth every day., Disp: , Rfl:   •  Lactobacillus (ACIDOPHOLUS PO), Take  by mouth. [id number] , Disp: , Rfl:   •  lorazepam (ATIVAN) 0.5 MG TABS, Take 0.5 mg by mouth 3 times a day as needed., Disp: , Rfl:   •  albuterol (VENTOLIN OR PROVENTIL) 108 (90 BASE) MCG/ACT AERS, Inhale 2 Puffs by mouth every 6 hours as needed., Disp: , Rfl:       Review of Systems:     Pertinent positives as per HPI, all other systems reviewed and WNL     Physical Assessment:     VS: /64   Pulse 63   Temp 36.8 °C (98.2 °F)   Resp 14   Ht 1.575 m (5' 2.01\")   Wt 58.2 kg (128 lb 4.8 oz)   SpO2 94%   Breastfeeding? No   BMI 23.46 kg/m²     General: Well-developed, well-nourished, female  "    Head: PERRL, EOMI. Normocephalic. No facial asymmetry noted.  Cardiovasc:RRR, no MRG. No thrills or bruits. Pulses 2+ and symmetric at all distal extremities.  Pulmonary: Lungs clear bilaterally.  Normal respiratory effort. No wheeze or crackles.   Neuro: Alert and oriented, CN II-XII grossly intact   Skin:No rashes noted. Skin warm, dry, intact    Psych: Dressed appropriately for the weather, pleasant and conversant.  Affect, mood & judgment appropriate.    5 minute work recall: remembers 0/3 words    Assessment/Plan:   Cortney was seen today for memory loss.    Diagnoses and all orders for this visit:    Dementia without behavioral disturbance, unspecified dementia type, uncontrolled   -Discussed with the patient and her family members findings of the MRI which show cerebral atrophy and changes suggestive of micro angiopathic ischemia.  Labs are non contributory. She does continue on Donepizil 10 mg daily without AE though without significant improvement in memory. We discussed Memantine 5mg BID and increasing to 10mg BID in 1 month. Also discussed referral to neurology, patient and family decline. Recommend they discuss long term care / end of life, we will complete POLST at follow up in 1 month.     -     memantine (NAMENDA) 5 MG Tab; Take 1 Tab by mouth 2 times a day.    Patient is agreeable to the above plan and voiced understanding. All questions answered.     Please note that this dictation was created using voice recognition software. I have made every reasonable attempt to correct obvious errors, but I expect that there are errors of grammar and possibly content that I did not discover before finalizing the note.      LENI Gonzalez  7/16/2018, 1:00 PM

## 2018-07-16 NOTE — ASSESSMENT & PLAN NOTE
"Patient is an 87-year-old female who is new to me, she is accompanied with her daughter Yanelis &  who are concerned about memory.  She tells me that her mother has a very hard time with short-term memory, she forgets having conversations, occasionally forgetting how to use the phone, forgetting to eat on occasion.  Her  manages their finances, he also manages cleaning of the home and meals.  The patient herself agrees that she does have very poor memory, she tells me that long-term memory is starting to diminish.  She does have a history of hypothyroidism, she has not had TSH or T4 monitored in 2 years.  Was diagnosed by an outside provider with \"dementia \"her daughter would like labs and appropriate workup for consideration of treatment in the future, she would also like to discuss a referral for home health.    Reviewed labs and MRI today. MRI shows cerebral atrophy and changes suggestive of underlying microangiopathy ischemia. Otherwise, labs were normal and noncontributory.     She is on Aricept 10mg daily, started by previous PCP.   "

## 2018-07-16 NOTE — PATIENT INSTRUCTIONS
Memantine Tablets  What is this medicine?  MEMANTINE (MEM an teen) is used to treat dementia caused by Alzheimer's disease.  This medicine may be used for other purposes; ask your health care provider or pharmacist if you have questions.  COMMON BRAND NAME(S): Cindy  What should I tell my health care provider before I take this medicine?  They need to know if you have any of these conditions:  -difficulty passing urine  -kidney disease  -liver disease  -seizures  -an unusual or allergic reaction to memantine, other medicines, foods, dyes, or preservatives  -pregnant or trying to get pregnant  -breast-feeding  How should I use this medicine?  Take this medicine by mouth with a glass of water. Follow the directions on the prescription label. You may take this medicine with or without food. Take your doses at regular intervals. Do not take your medicine more often than directed. Continue to take your medicine even if you feel better. Do not stop taking except on the advice of your doctor or health care professional.  Talk to your pediatrician regarding the use of this medicine in children. Special care may be needed.  Overdosage: If you think you have taken too much of this medicine contact a poison control center or emergency room at once.  NOTE: This medicine is only for you. Do not share this medicine with others.  What if I miss a dose?  If you miss a dose, take it as soon as you can. If it is almost time for your next dose, take only that dose. Do not take double or extra doses. If you do not take your medicine for several days, contact your health care provider. Your dose may need to be changed.

## 2018-08-16 ENCOUNTER — OFFICE VISIT (OUTPATIENT)
Dept: MEDICAL GROUP | Facility: PHYSICIAN GROUP | Age: 83
End: 2018-08-16
Payer: MEDICARE

## 2018-08-16 VITALS
TEMPERATURE: 98.3 F | DIASTOLIC BLOOD PRESSURE: 60 MMHG | SYSTOLIC BLOOD PRESSURE: 110 MMHG | HEIGHT: 62 IN | BODY MASS INDEX: 23.79 KG/M2 | WEIGHT: 129.3 LBS | HEART RATE: 60 BPM | OXYGEN SATURATION: 93 % | RESPIRATION RATE: 16 BRPM

## 2018-08-16 DIAGNOSIS — Z71.89 COUNSELING REGARDING END OF LIFE DECISION MAKING: ICD-10-CM

## 2018-08-16 PROCEDURE — 99497 ADVNCD CARE PLAN 30 MIN: CPT | Performed by: NURSE PRACTITIONER

## 2018-08-16 NOTE — PROGRESS NOTES
[facility]  Primary Care Office Visit - Problem-Oriented        History:     Cortney Sullivan is a 87 y.o. female who is here today to discuss Poor Memory (FV)      Counseling regarding end of life decision making  Patient is an 87-year-old female with progressive dementia, hypertension, dyslipidemia, anxiety, hypothyroidism and history of breast cancer here with her  and daughter Yanelis who is DURABLE POWER OF .  They bring with them today [place] POLST form. Cortney is able to verbalize her wishes to me today and she makes these decisions on her own accord and without coercion.  Discussed with the patient,  and daughter that this form is to be used when the patient lacks decisional capacity.  She wishes to have a natural death, no CPR.  She wishes for comfort measures only, no antibiotics, IV, ICU, diagnostics, lab work, dialysis, antiarrhythmics as outlined in the POLST.  She is an organ donor and this is documented on her 's license.  The form is signed and witnessed.  We will make a copy for her chart, she was encouraged to keep a copy in her home in multiple locations for easy access in the event that EMT is called to her own that they may uphold her last dying wishes. All questions were answered.         Past Medical History:   Diagnosis Date   • CAD (coronary artery disease)    • Cancer (HCC)     breast   • High cholesterol    • Hypertension    • Hypothyroidism    • Pancreatitis, acute     resolved     Past Surgical History:   Procedure Laterality Date   • LUMPECTOMY      & radiation only     Social History     Social History   • Marital status: Unknown     Spouse name: N/A   • Number of children: N/A   • Years of education: N/A     Occupational History   • Not on file.     Social History Main Topics   • Smoking status: Former Smoker     Types: Cigarettes   • Smokeless tobacco: Never Used      Comment: 10 pk/yr hs   • Alcohol use Yes      Comment: 4-8 oz/day   • Drug use:  "No   • Sexual activity: Yes     Partners: Male     Other Topics Concern   • Not on file     Social History Narrative   • No narrative on file     History   Smoking Status   • Former Smoker   • Types: Cigarettes   Smokeless Tobacco   • Never Used     Comment: 10 pk/yr hs     Family History   Problem Relation Age of Onset   • Dementia Brother      No Known Allergies    Problem List:     Patient Active Problem List    Diagnosis Date Noted   • Counseling regarding end of life decision making 08/16/2018   • Dementia without behavioral disturbance 06/14/2018   • Anxiety 12/13/2017   • History of breast cancer 12/13/2017   • Hypertension    • High cholesterol    • Hypothyroidism          Medications:     Current Outpatient Prescriptions:   •  memantine (NAMENDA) 5 MG Tab, Take 1 Tab by mouth 2 times a day., Disp: 180 Tab, Rfl: 1  •  donepezil (ARICEPT) 5 MG Tab, Take 2 Tabs by mouth every evening., Disp: 60 Tab, Rfl: 11  •  levothyroxine (SYNTHROID) 50 MCG Tab, Take 1 Tab by mouth every day., Disp: 90 Tab, Rfl: 1  •  lovastatin (MEVACOR) 40 MG tablet, Take 1 Tab by mouth every day., Disp: 90 Tab, Rfl: 1  •  Melatonin 10 MG Tab, Take  by mouth., Disp: , Rfl:   •  Lactobacillus (ACIDOPHOLUS PO), Take  by mouth. [id number] , Disp: , Rfl:   •  lorazepam (ATIVAN) 0.5 MG TABS, Take 0.5 mg by mouth 3 times a day as needed., Disp: , Rfl:   •  albuterol (VENTOLIN OR PROVENTIL) 108 (90 BASE) MCG/ACT AERS, Inhale 2 Puffs by mouth every 6 hours as needed., Disp: , Rfl:   •  aliskiren (TEKTURNA) 300 MG tablet, TAKE ONE TABLET BY MOUTH EVERY DAY, Disp: 90 Tab, Rfl: 1  •  aspirin 81 MG tablet, Take 81 mg by mouth every day., Disp: , Rfl:       Review of Systems:     Pertinent positives as per HPI, all other systems reviewed and WNL     Physical Assessment:     VS: /60   Pulse 60   Temp 36.8 °C (98.3 °F)   Resp 16   Ht 1.575 m (5' 2.01\")   Wt 58.7 kg (129 lb 4.8 oz)   SpO2 93%   Breastfeeding? No   BMI 23.64 kg/m²     General: " Well-developed, well-nourished, female     Head: PERRL, EOMI. Normocephalic. No facial asymmetry noted.  Neuro: Alert  Skin:No rashes noted. Skin warm, dry, intact    Psych: Dressed appropriately for the weather, pleasant and conversant.  Affect, mood & judgment appropriate.      Assessment/Plan:   Cortney was seen today for poor memory.    Diagnoses and all orders for this visit:    Counseling regarding end of life decision making  -[place] POLST form is signed and witness, this is scanned to her chart, she will keep a copy in her home along with a copy of her notarized DPOA.       Patient is agreeable to the above plan and voiced understanding. All questions answered.     Please note that this dictation was created using voice recognition software. I have made every reasonable attempt to correct obvious errors, but I expect that there are errors of grammar and possibly content that I did not discover before finalizing the note.      LENI Gonzalez  8/16/2018, 11:59 AM

## 2018-08-16 NOTE — ASSESSMENT & PLAN NOTE
Patient is an 87-year-old female with progressive dementia, hypertension, dyslipidemia, anxiety, hypothyroidism and history of breast cancer here with her  and daughter Yanelis who is DURABLE POWER OF .  They bring with them today [place] POLST form. Cortney is able to verbalize her wishes to me today and she makes these decisions on her own accord and without coercion.  Discussed with the patient,  and daughter that this form is to be used when the patient lacks decisional capacity.  She wishes to have a natural death, no CPR.  She wishes for comfort measures only, no antibiotics, IV, ICU, diagnostics, lab work, dialysis, antiarrhythmics as outlined in the POLST.  She is an organ donor and this is documented on her 's license.  The form is signed and witnessed.  We will make a copy for her chart, she was encouraged to keep a copy in her home in multiple locations for easy access in the event that EMT is called to her own that they may uphold her last dying wishes. All questions were answered.

## 2018-09-27 ENCOUNTER — OFFICE VISIT (OUTPATIENT)
Dept: URGENT CARE | Facility: PHYSICIAN GROUP | Age: 83
End: 2018-09-27
Payer: MEDICARE

## 2018-09-27 VITALS
TEMPERATURE: 98.6 F | DIASTOLIC BLOOD PRESSURE: 62 MMHG | BODY MASS INDEX: 23.55 KG/M2 | RESPIRATION RATE: 14 BRPM | OXYGEN SATURATION: 91 % | HEIGHT: 62 IN | HEART RATE: 83 BPM | WEIGHT: 128 LBS | SYSTOLIC BLOOD PRESSURE: 120 MMHG

## 2018-09-27 DIAGNOSIS — R06.02 SOB (SHORTNESS OF BREATH): ICD-10-CM

## 2018-09-27 PROCEDURE — 94640 AIRWAY INHALATION TREATMENT: CPT | Performed by: PHYSICIAN ASSISTANT

## 2018-09-27 PROCEDURE — 99214 OFFICE O/P EST MOD 30 MIN: CPT | Mod: 25 | Performed by: PHYSICIAN ASSISTANT

## 2018-09-27 RX ORDER — ALBUTEROL SULFATE 90 UG/1
2 AEROSOL, METERED RESPIRATORY (INHALATION) EVERY 4 HOURS PRN
Qty: 1 INHALER | Refills: 0 | Status: SHIPPED | OUTPATIENT
Start: 2018-09-27 | End: 2019-02-19 | Stop reason: SDUPTHER

## 2018-09-27 RX ORDER — ALBUTEROL SULFATE 2.5 MG/3ML
2.5 SOLUTION RESPIRATORY (INHALATION) ONCE
Status: COMPLETED | OUTPATIENT
Start: 2018-09-27 | End: 2018-09-27

## 2018-09-27 RX ADMIN — ALBUTEROL SULFATE 2.5 MG: 2.5 SOLUTION RESPIRATORY (INHALATION) at 13:09

## 2018-09-27 NOTE — PROGRESS NOTES
Chief Complaint   Patient presents with   • Cough     SOB, dizzy       HISTORY OF PRESENT ILLNESS: Patient is a 87 y.o. female who presents today because she states that her  is making her.  She has been having chronically worsening fatigue, this morning after doing a few things around the house she became more short of breath than usual.  But it subsided fairly quickly.  She denies any chest pain, palpitations, no nausea, vomiting or diarrhea.  No recent fevers or chills.  She does not smoke but does have a 10-pack-year history of smoking.    Patient Active Problem List    Diagnosis Date Noted   • Counseling regarding end of life decision making 08/16/2018   • Dementia without behavioral disturbance 06/14/2018   • Anxiety 12/13/2017   • History of breast cancer 12/13/2017   • Hypertension    • High cholesterol    • Hypothyroidism        Allergies:Patient has no known allergies.    Current Outpatient Prescriptions Ordered in [facility]   Medication Sig Dispense Refill   • albuterol 108 (90 Base) MCG/ACT Aero Soln inhalation aerosol Inhale 2 Puffs by mouth every four hours as needed. 1 Inhaler 0   • memantine (NAMENDA) 5 MG Tab Take 1 Tab by mouth 2 times a day. 180 Tab 1   • donepezil (ARICEPT) 5 MG Tab Take 2 Tabs by mouth every evening. 60 Tab 11   • levothyroxine (SYNTHROID) 50 MCG Tab Take 1 Tab by mouth every day. 90 Tab 1   • lovastatin (MEVACOR) 40 MG tablet Take 1 Tab by mouth every day. 90 Tab 1   • Melatonin 10 MG Tab Take  by mouth.     • Lactobacillus (ACIDOPHOLUS PO) Take  by mouth. [id number]      • lorazepam (ATIVAN) 0.5 MG TABS Take 0.5 mg by mouth 3 times a day as needed.     • albuterol (VENTOLIN OR PROVENTIL) 108 (90 BASE) MCG/ACT AERS Inhale 2 Puffs by mouth every 6 hours as needed.     • aliskiren (TEKTURNA) 300 MG tablet TAKE ONE TABLET BY MOUTH EVERY DAY 90 Tab 1   • aspirin 81 MG tablet Take 81 mg by mouth every day.       No current [facility]-ordered facility-administered medications on file.   "      Past Medical History:   Diagnosis Date   • CAD (coronary artery disease)    • Cancer (HCC)     breast   • High cholesterol    • Hypertension    • Hypothyroidism    • Pancreatitis, acute     resolved       Social History   Substance Use Topics   • Smoking status: Former Smoker     Types: Cigarettes   • Smokeless tobacco: Never Used      Comment: 10 pk/yr hs   • Alcohol use Yes      Comment: 4-8 oz/day       Family Status   Relation Status   • Mo  at age 80        flu   • Fa  at age 80s   • Bro Alive     Family History   Problem Relation Age of Onset   • Dementia Brother        ROS:  Review of Systems   Constitutional: Negative for fever, chills, weight loss and malaise/fatigue.   HENT: Negative for ear pain, nosebleeds, congestion, sore throat and neck pain.    Eyes: Negative for blurred vision.   Respiratory: Negative for cough, sputum production, no positive for resolved shortness of breath and no wheezing.    Cardiovascular: Negative for chest pain, palpitations, orthopnea and leg swelling.   Gastrointestinal: Negative for heartburn, nausea, vomiting and abdominal pain.   Genitourinary: Negative for dysuria, urgency and frequency.     Exam:  Blood pressure 120/62, pulse 83, temperature 37 °C (98.6 °F), temperature source Temporal, resp. rate 14, height 1.575 m (5' 2.01\"), weight 58.1 kg (128 lb), SpO2 91 %, not currently breastfeeding.  General:  Well nourished, well developed female in NAD  Head:Normocephalic, atraumatic  Eyes: PERRLA, EOM within normal limits, no conjunctival injection, no scleral icterus, visual fields and acuity grossly intact.  Ears: Normal shape and symmetry, no tenderness, no discharge. External canals are without any significant edema or erythema. Tympanic membranes are without any inflammation, no effusion. Gross auditory acuity is intact  Nose: Symmetrical without tenderness, no discharge.  Mouth: reasonable hygiene, no erythema exudates or tonsillar " enlargement.  Neck: no masses, range of motion within normal limits, no tracheal deviation. No obvious thyroid enlargement.  Pulmonary: chest is symmetrical with respiration, breath sounds are somewhat diminished bilaterally, no wheezes, rales or rhonchi.  After nebulization of albuterol in the office, patient states minimal subjective improvement, however there is a drastic increase in air exchange, no wheezes, rales or rhonchi.  cardiovascular: regular rate and rhythm without murmurs, rubs, or gallops.  Extremities: no clubbing, cyanosis, or edema.    Please note that this dictation was created using voice recognition software. I have made every reasonable attempt to correct obvious errors, but I expect that there are errors of grammar and possibly content that I did not discover before finalizing the note.    Assessment/Plan:  1. SOB (shortness of breath)  albuterol (PROVENTIL) 2.5mg/3ml nebulizer solution 2.5 mg    albuterol 108 (90 Base) MCG/ACT Aero Soln inhalation aerosol   Reassured patient that there is no evidence of infection, vital signs stable, symptoms improved with albuterol.    Followup with primary care in the next 7-10 days if not significantly improving, return to the urgent care or go to the emergency room sooner for any worsening of symptoms.

## 2018-10-16 ENCOUNTER — OFFICE VISIT (OUTPATIENT)
Dept: URGENT CARE | Facility: PHYSICIAN GROUP | Age: 83
End: 2018-10-16
Payer: MEDICARE

## 2018-10-16 VITALS
RESPIRATION RATE: 18 BRPM | OXYGEN SATURATION: 93 % | WEIGHT: 128 LBS | HEIGHT: 62 IN | BODY MASS INDEX: 23.55 KG/M2 | SYSTOLIC BLOOD PRESSURE: 138 MMHG | TEMPERATURE: 98.2 F | HEART RATE: 93 BPM | DIASTOLIC BLOOD PRESSURE: 80 MMHG

## 2018-10-16 DIAGNOSIS — J22 LRTI (LOWER RESPIRATORY TRACT INFECTION): ICD-10-CM

## 2018-10-16 PROCEDURE — 99214 OFFICE O/P EST MOD 30 MIN: CPT | Performed by: PHYSICIAN ASSISTANT

## 2018-10-16 RX ORDER — AZITHROMYCIN 250 MG/1
TABLET, FILM COATED ORAL
Qty: 6 TAB | Refills: 0 | Status: SHIPPED | OUTPATIENT
Start: 2018-10-16 | End: 2018-12-06

## 2018-10-16 RX ORDER — UBIDECARENONE 75 MG
100 CAPSULE ORAL DAILY
COMMUNITY
End: 2019-03-04

## 2018-10-16 RX ORDER — METHYLPREDNISOLONE 4 MG/1
4 TABLET ORAL SEE ADMIN INSTRUCTIONS
Qty: 21 TAB | Refills: 0 | Status: SHIPPED | OUTPATIENT
Start: 2018-10-16 | End: 2018-12-06

## 2018-10-16 NOTE — PROGRESS NOTES
Chief Complaint   Patient presents with   • Cough     cough, tired, dizzy x1 month        HISTORY OF PRESENT ILLNESS: Patient is a 87 y.o. female who presents today because she has been having phlegm production coughing intermittently over the last month, worsening over the last week or so.  Symptoms are worse in the morning.  She denies any fevers, chills, nausea, vomiting or diarrhea.  This morning she had quite a bit of shortness of breath, used her albuterol inhaler and it did seem to help.    Patient Active Problem List    Diagnosis Date Noted   • Counseling regarding end of life decision making 08/16/2018   • Dementia without behavioral disturbance 06/14/2018   • Anxiety 12/13/2017   • History of breast cancer 12/13/2017   • Hypertension    • High cholesterol    • Hypothyroidism        Allergies:Patient has no known allergies.    Current Outpatient Prescriptions Ordered in [facility]   Medication Sig Dispense Refill   • Ascorbic Acid (YAW-C PO) Take  by mouth.     • cyanocobalamin (VITAMIN B-12) 100 MCG Tab Take 100 mcg by mouth every day.     • azithromycin (ZITHROMAX) 250 MG Tab Follow package directions 6 Tab 0   • MethylPREDNISolone (MEDROL DOSEPAK) 4 MG Tablet Therapy Pack Take 1 Tab by mouth See Admin Instructions. 21 Tab 0   • albuterol 108 (90 Base) MCG/ACT Aero Soln inhalation aerosol Inhale 2 Puffs by mouth every four hours as needed. 1 Inhaler 0   • aliskiren (TEKTURNA) 300 MG tablet TAKE ONE TABLET BY MOUTH EVERY DAY 90 Tab 1   • aspirin 81 MG tablet Take 81 mg by mouth every day.     • memantine (NAMENDA) 5 MG Tab Take 1 Tab by mouth 2 times a day. 180 Tab 1   • donepezil (ARICEPT) 5 MG Tab Take 2 Tabs by mouth every evening. 60 Tab 11   • levothyroxine (SYNTHROID) 50 MCG Tab Take 1 Tab by mouth every day. 90 Tab 1   • lovastatin (MEVACOR) 40 MG tablet Take 1 Tab by mouth every day. 90 Tab 1   • Melatonin 10 MG Tab Take  by mouth.     • Lactobacillus (ACIDOPHOLUS PO) Take  by mouth. [id number]      •  "lorazepam (ATIVAN) 0.5 MG TABS Take 0.5 mg by mouth 3 times a day as needed.     • albuterol (VENTOLIN OR PROVENTIL) 108 (90 BASE) MCG/ACT AERS Inhale 2 Puffs by mouth every 6 hours as needed.       No current Epic-ordered facility-administered medications on file.        Past Medical History:   Diagnosis Date   • CAD (coronary artery disease)    • Cancer (HCC)     breast   • High cholesterol    • Hypertension    • Hypothyroidism    • Pancreatitis, acute     resolved       Social History   Substance Use Topics   • Smoking status: Former Smoker     Types: Cigarettes   • Smokeless tobacco: Never Used      Comment: 10 pk/yr hs   • Alcohol use 2.4 oz/week     4 Glasses of wine per week      Comment: 4-8 oz/day       Family Status   Relation Status   • Mo  at age 80        flu   • Fa  at age 80s   • Bro Alive     Family History   Problem Relation Age of Onset   • Dementia Brother        ROS:  Review of Systems   Constitutional: Negative for fever, chills, weight loss and malaise/fatigue.   HENT: Negative for ear pain, nosebleeds, congestion, sore throat and neck pain.    Eyes: Negative for blurred vision.   Respiratory: Positive for cough, sputum production, shortness of breath and wheezing.    Cardiovascular: Negative for chest pain, palpitations, orthopnea and leg swelling.   Gastrointestinal: Negative for heartburn, nausea, vomiting and abdominal pain.   Genitourinary: Negative for dysuria, urgency and frequency.     Exam:  Blood pressure 138/80, pulse 93, temperature 36.8 °C (98.2 °F), temperature source Temporal, resp. rate 18, height 1.575 m (5' 2\"), weight 58.1 kg (128 lb), SpO2 93 %, not currently breastfeeding.  General:  Well nourished, well developed female in NAD  Head:Normocephalic, atraumatic  Eyes: PERRLA, EOM within normal limits, no conjunctival injection, no scleral icterus, visual fields and acuity grossly intact.  Ears: Normal shape and symmetry, no tenderness, no discharge. External " canals are without any significant edema or erythema. Tympanic membranes are without any inflammation, no effusion. Gross auditory acuity is intact  Nose: Symmetrical without tenderness, no discharge.  Mouth: reasonable hygiene, no erythema exudates or tonsillar enlargement.  Neck: no masses, range of motion within normal limits, no tracheal deviation. No obvious thyroid enlargement.  Pulmonary: chest is symmetrical with respiration, breath sounds are diminished bilaterally, there are inspiratory rales and expiratory rhonchi in the right lower lobe, not clearing with cough.    Cardiovascular: regular rate and rhythm without murmurs, rubs, or gallops.  Extremities: no clubbing, cyanosis, or edema.    Please note that this dictation was created using voice recognition software. I have made every reasonable attempt to correct obvious errors, but I expect that there are errors of grammar and possibly content that I did not discover before finalizing the note.    Assessment/Plan:  1. LRTI (lower respiratory tract infection)  azithromycin (ZITHROMAX) 250 MG Tab    MethylPREDNISolone (MEDROL DOSEPAK) 4 MG Tablet Therapy Pack   Follow-up in 1 week if not significantly improved, will consider imaging and labs at that time.    Followup with primary care in the next 7-10 days if not significantly improving, return to the urgent care or go to the emergency room sooner for any worsening of symptoms.

## 2018-10-23 ENCOUNTER — HOSPITAL ENCOUNTER (OUTPATIENT)
Dept: LAB | Facility: MEDICAL CENTER | Age: 83
End: 2018-10-23
Attending: PHYSICIAN ASSISTANT
Payer: MEDICARE

## 2018-10-23 ENCOUNTER — OFFICE VISIT (OUTPATIENT)
Dept: URGENT CARE | Facility: PHYSICIAN GROUP | Age: 83
End: 2018-10-23
Payer: MEDICARE

## 2018-10-23 VITALS
BODY MASS INDEX: 23.55 KG/M2 | DIASTOLIC BLOOD PRESSURE: 82 MMHG | OXYGEN SATURATION: 92 % | SYSTOLIC BLOOD PRESSURE: 140 MMHG | HEIGHT: 62 IN | WEIGHT: 128 LBS | RESPIRATION RATE: 16 BRPM | HEART RATE: 92 BPM | TEMPERATURE: 97.4 F

## 2018-10-23 DIAGNOSIS — R53.83 OTHER FATIGUE: Primary | ICD-10-CM

## 2018-10-23 DIAGNOSIS — R53.83 OTHER FATIGUE: ICD-10-CM

## 2018-10-23 LAB
ALBUMIN SERPL BCP-MCNC: 4.5 G/DL (ref 3.2–4.9)
ALBUMIN/GLOB SERPL: 1.4 G/DL
ALP SERPL-CCNC: 63 U/L (ref 30–99)
ALT SERPL-CCNC: 23 U/L (ref 2–50)
ANION GAP SERPL CALC-SCNC: 10 MMOL/L (ref 0–11.9)
AST SERPL-CCNC: 23 U/L (ref 12–45)
BASOPHILS # BLD AUTO: 0.4 % (ref 0–1.8)
BASOPHILS # BLD: 0.05 K/UL (ref 0–0.12)
BILIRUB SERPL-MCNC: 0.7 MG/DL (ref 0.1–1.5)
BUN SERPL-MCNC: 18 MG/DL (ref 8–22)
CALCIUM SERPL-MCNC: 10.6 MG/DL (ref 8.5–10.5)
CHLORIDE SERPL-SCNC: 85 MMOL/L (ref 96–112)
CO2 SERPL-SCNC: 30 MMOL/L (ref 20–33)
CREAT SERPL-MCNC: 0.81 MG/DL (ref 0.5–1.4)
EOSINOPHIL # BLD AUTO: 0.03 K/UL (ref 0–0.51)
EOSINOPHIL NFR BLD: 0.2 % (ref 0–6.9)
ERYTHROCYTE [DISTWIDTH] IN BLOOD BY AUTOMATED COUNT: 41.5 FL (ref 35.9–50)
GLOBULIN SER CALC-MCNC: 3.2 G/DL (ref 1.9–3.5)
GLUCOSE SERPL-MCNC: 109 MG/DL (ref 65–99)
HCT VFR BLD AUTO: 44.2 % (ref 37–47)
HGB BLD-MCNC: 15.6 G/DL (ref 12–16)
IMM GRANULOCYTES # BLD AUTO: 0.03 K/UL (ref 0–0.11)
IMM GRANULOCYTES NFR BLD AUTO: 0.2 % (ref 0–0.9)
LYMPHOCYTES # BLD AUTO: 1.49 K/UL (ref 1–4.8)
LYMPHOCYTES NFR BLD: 11.2 % (ref 22–41)
MCH RBC QN AUTO: 32.4 PG (ref 27–33)
MCHC RBC AUTO-ENTMCNC: 35.3 G/DL (ref 33.6–35)
MCV RBC AUTO: 91.7 FL (ref 81.4–97.8)
MONOCYTES # BLD AUTO: 0.93 K/UL (ref 0–0.85)
MONOCYTES NFR BLD AUTO: 7 % (ref 0–13.4)
NEUTROPHILS # BLD AUTO: 10.81 K/UL (ref 2–7.15)
NEUTROPHILS NFR BLD: 81 % (ref 44–72)
NRBC # BLD AUTO: 0 K/UL
NRBC BLD-RTO: 0 /100 WBC
PLATELET # BLD AUTO: 352 K/UL (ref 164–446)
PMV BLD AUTO: 8.3 FL (ref 9–12.9)
POTASSIUM SERPL-SCNC: 3.3 MMOL/L (ref 3.6–5.5)
PROT SERPL-MCNC: 7.7 G/DL (ref 6–8.2)
RBC # BLD AUTO: 4.82 M/UL (ref 4.2–5.4)
SODIUM SERPL-SCNC: 125 MMOL/L (ref 135–145)
WBC # BLD AUTO: 13.3 K/UL (ref 4.8–10.8)

## 2018-10-23 PROCEDURE — 84443 ASSAY THYROID STIM HORMONE: CPT

## 2018-10-23 PROCEDURE — 84439 ASSAY OF FREE THYROXINE: CPT

## 2018-10-23 PROCEDURE — 36415 COLL VENOUS BLD VENIPUNCTURE: CPT

## 2018-10-23 PROCEDURE — 80053 COMPREHEN METABOLIC PANEL: CPT

## 2018-10-23 PROCEDURE — 85025 COMPLETE CBC W/AUTO DIFF WBC: CPT

## 2018-10-23 PROCEDURE — 99214 OFFICE O/P EST MOD 30 MIN: CPT | Performed by: PHYSICIAN ASSISTANT

## 2018-10-23 NOTE — PROGRESS NOTES
Chief Complaint   Patient presents with   • Other       HISTORY OF PRESENT ILLNESS: Patient is a 87 y.o. female who presents today because she has fatigue.  States that she has no energy to go to her regular daily activities.  This is been going on for several months, but she also recently is getting over a lower respiratory tract infection.  Denies any fevers, chills, nausea, vomiting or diarrhea.  Denies any shortness of breath or chest pain, denies any numbness or tingling in her face or extremities.    Patient Active Problem List    Diagnosis Date Noted   • Counseling regarding end of life decision making 08/16/2018   • Dementia without behavioral disturbance 06/14/2018   • Anxiety 12/13/2017   • History of breast cancer 12/13/2017   • Hypertension    • High cholesterol    • Hypothyroidism        Allergies:Patient has no known allergies.    Current Outpatient Prescriptions Ordered in [facility]   Medication Sig Dispense Refill   • Ascorbic Acid (YAW-C PO) Take  by mouth.     • cyanocobalamin (VITAMIN B-12) 100 MCG Tab Take 100 mcg by mouth every day.     • azithromycin (ZITHROMAX) 250 MG Tab Follow package directions 6 Tab 0   • MethylPREDNISolone (MEDROL DOSEPAK) 4 MG Tablet Therapy Pack Take 1 Tab by mouth See Admin Instructions. 21 Tab 0   • albuterol 108 (90 Base) MCG/ACT Aero Soln inhalation aerosol Inhale 2 Puffs by mouth every four hours as needed. 1 Inhaler 0   • memantine (NAMENDA) 5 MG Tab Take 1 Tab by mouth 2 times a day. 180 Tab 1   • donepezil (ARICEPT) 5 MG Tab Take 2 Tabs by mouth every evening. 60 Tab 11   • aliskiren (TEKTURNA) 300 MG tablet TAKE ONE TABLET BY MOUTH EVERY DAY 90 Tab 1   • levothyroxine (SYNTHROID) 50 MCG Tab Take 1 Tab by mouth every day. 90 Tab 1   • lovastatin (MEVACOR) 40 MG tablet Take 1 Tab by mouth every day. 90 Tab 1   • aspirin 81 MG tablet Take 81 mg by mouth every day.     • Melatonin 10 MG Tab Take  by mouth.     • Lactobacillus (ACIDOPHOLUS PO) Take  by mouth. [id number]     "  • lorazepam (ATIVAN) 0.5 MG TABS Take 0.5 mg by mouth 3 times a day as needed.     • albuterol (VENTOLIN OR PROVENTIL) 108 (90 BASE) MCG/ACT AERS Inhale 2 Puffs by mouth every 6 hours as needed.       No current Epic-ordered facility-administered medications on file.        Past Medical History:   Diagnosis Date   • CAD (coronary artery disease)    • Cancer (HCC)     breast   • High cholesterol    • Hypertension    • Hypothyroidism    • Pancreatitis, acute     resolved       Social History   Substance Use Topics   • Smoking status: Former Smoker     Types: Cigarettes   • Smokeless tobacco: Never Used      Comment: 10 pk/yr hs   • Alcohol use 2.4 oz/week     4 Glasses of wine per week      Comment: 4-8 oz/day       Family Status   Relation Status   • Mo  at age 80        flu   • Fa  at age 80s   • Bro Alive     Family History   Problem Relation Age of Onset   • Dementia Brother        ROS:  Review of Systems   Constitutional: Negative for fever, chills, weight loss and positive for malaise/fatigue.   HENT: Negative for ear pain, nosebleeds, congestion, sore throat and neck pain.    Eyes: Negative for blurred vision.   Respiratory: Negative for cough, sputum production, shortness of breath and wheezing.    Cardiovascular: Negative for chest pain, palpitations, orthopnea and leg swelling.   Gastrointestinal: Negative for heartburn, nausea, vomiting and abdominal pain.   Genitourinary: Negative for dysuria, urgency and frequency.     Exam:  Blood pressure 140/82, pulse 92, temperature 36.3 °C (97.4 °F), temperature source Temporal, resp. rate 16, height 1.575 m (5' 2\"), weight 58.1 kg (128 lb), SpO2 92 %, not currently breastfeeding.  General:  Well nourished, well developed female in NAD  Head:Normocephalic, atraumatic  Eyes: PERRLA, EOM within normal limits, no conjunctival injection, no scleral icterus, visual fields and acuity grossly intact.  Ears: Normal shape and symmetry, no tenderness, no " discharge. External canals are without any significant edema or erythema. Tympanic membranes are without any inflammation, no effusion. Gross auditory acuity is intact  Nose: Symmetrical without tenderness, no discharge.  Mouth: reasonable hygiene, no erythema exudates or tonsillar enlargement.  Neck: no masses, range of motion within normal limits, no tracheal deviation. No obvious thyroid enlargement.  Pulmonary: chest is symmetrical with respiration, no wheezes, crackles, or rhonchi.  Cardiovascular: regular rate and rhythm without murmurs, rubs, or gallops.  Extremities: no clubbing, cyanosis, or edema.    Please note that this dictation was created using voice recognition software. I have made every reasonable attempt to correct obvious errors, but I expect that there are errors of grammar and possibly content that I did not discover before finalizing the note.    Assessment/Plan:  1. Other fatigue  CBC WITH DIFFERENTIAL    COMP METABOLIC PANEL    TSH+FREE T4    CANCELED: POCT Urinalysis       Followup with primary care in the next 7-10 days if not significantly improving, return to the urgent care or go to the emergency room sooner for any worsening of symptoms.

## 2018-10-24 DIAGNOSIS — E03.9 HYPOTHYROIDISM, UNSPECIFIED TYPE: ICD-10-CM

## 2018-10-24 LAB
T4 FREE SERPL-MCNC: 0.99 NG/DL (ref 0.53–1.43)
TSH SERPL DL<=0.005 MIU/L-ACNC: 4.9 UIU/ML (ref 0.38–5.33)

## 2018-10-24 NOTE — TELEPHONE ENCOUNTER
Was the patient seen in the last year in this department? Yes    Does patient have an active prescription for medications requested? No     Received Request Via: Pharmacy      Pt met protocol?: Yes  pt last ov 8/2018   TSH   Date Value Ref Range Status   10/23/2018 4.900 0.380 - 5.330 uIU/mL Final     Comment:     Please note new reference ranges effective 12/14/2017 10:00 AM  Pregnant Females, 1st Trimester  0.050-3.700  Pregnant Females, 2nd Trimester  0.310-4.350  Pregnant Females, 3rd Trimester  0.410-5.180       Free T-4   Date Value Ref Range Status   10/23/2018 0.99 0.53 - 1.43 ng/dL Final

## 2018-10-25 RX ORDER — LEVOTHYROXINE SODIUM 0.05 MG/1
50 TABLET ORAL
Qty: 90 TAB | Refills: 1 | Status: SHIPPED | OUTPATIENT
Start: 2018-10-25 | End: 2018-10-30

## 2018-10-30 ENCOUNTER — OFFICE VISIT (OUTPATIENT)
Dept: MEDICAL GROUP | Facility: PHYSICIAN GROUP | Age: 83
End: 2018-10-30
Payer: MEDICARE

## 2018-10-30 VITALS
DIASTOLIC BLOOD PRESSURE: 74 MMHG | BODY MASS INDEX: 20.83 KG/M2 | HEIGHT: 65 IN | OXYGEN SATURATION: 93 % | HEART RATE: 76 BPM | RESPIRATION RATE: 16 BRPM | SYSTOLIC BLOOD PRESSURE: 120 MMHG | TEMPERATURE: 98.7 F | WEIGHT: 125 LBS

## 2018-10-30 DIAGNOSIS — D72.829 LEUKOCYTOSIS, UNSPECIFIED TYPE: ICD-10-CM

## 2018-10-30 DIAGNOSIS — I10 ESSENTIAL HYPERTENSION: ICD-10-CM

## 2018-10-30 DIAGNOSIS — R53.82 CHRONIC FATIGUE: Primary | ICD-10-CM

## 2018-10-30 DIAGNOSIS — E87.1 HYPONATREMIA: ICD-10-CM

## 2018-10-30 DIAGNOSIS — E03.9 ACQUIRED HYPOTHYROIDISM: ICD-10-CM

## 2018-10-30 PROCEDURE — 81002 URINALYSIS NONAUTO W/O SCOPE: CPT | Performed by: NURSE PRACTITIONER

## 2018-10-30 PROCEDURE — 99214 OFFICE O/P EST MOD 30 MIN: CPT | Performed by: NURSE PRACTITIONER

## 2018-10-30 RX ORDER — LEVOTHYROXINE SODIUM 0.07 MG/1
75 TABLET ORAL
Qty: 30 TAB | Refills: 1 | Status: SHIPPED | OUTPATIENT
Start: 2018-10-30 | End: 2019-02-19 | Stop reason: SDUPTHER

## 2018-10-30 RX ORDER — ALISKIREN 300 MG/1
150 TABLET, FILM COATED ORAL DAILY
Qty: 45 TAB | Refills: 1
Start: 2018-10-30 | End: 2019-01-29

## 2018-10-30 ASSESSMENT — PATIENT HEALTH QUESTIONNAIRE - PHQ9
SUM OF ALL RESPONSES TO PHQ QUESTIONS 1-9: 11
CLINICAL INTERPRETATION OF PHQ2 SCORE: 3
5. POOR APPETITE OR OVEREATING: 0 - NOT AT ALL

## 2018-10-30 NOTE — PATIENT INSTRUCTIONS
To do:     Stop lovastatin   Taper and discontinue namenda   No longer on donepezil   No longer taking water pill     Start increased dose of thyroid med, 75mcg, repeat labs in 4 weeks.     Cut Tekturna in half and monitor bp at home. Follow up in 2 weeks with repeat labs (not thyroid labs).

## 2018-10-30 NOTE — ASSESSMENT & PLAN NOTE
Patient is an 87-year-old female who is here to discuss ongoing fatigue. She does reports baseline fatigue but this has intensified over the last 2 months. She feels like sleeping all day.  She tells me that she does not feel like doing the dishes and has been letting the dishes pile up in the sink because she is simply too exhausted to do anything about it, similarly she feels too exhausted to cook or eat meals throughout the day.  She does have a history of hypothyroidism, TSH is elevated at 4.9.  She has seen urgent care who treated her empirically for a lower respiratory infection with antibiotics and oral steroids, her symptoms have not significantly improved following completion of these.  Has been afebrile, no cough, dysuria, hematuria, nausea, vomiting, diarrhea.  Her  is concerned that her fatigue may be related to some of her medications, specifically Namenda which they have not found to be overly beneficial in the setting of dementia and they would like to discontinue this.  On further review of her medications it appears that she may have been taking a few old meds that I have never prescribed for her such as hydrochlorothiazide, she did have a low potassium on last set of labs two weeks ago. Daughter would like to manage her meds,  feels that is unnecessary.  Had mild leukocytosis 2 weeks ago with last set of labs, this was following azithromycin.  She denies depression.

## 2018-10-31 LAB
APPEARANCE UR: CLEAR
BILIRUB UR STRIP-MCNC: NEGATIVE MG/DL
COLOR UR AUTO: YELLOW
GLUCOSE UR STRIP.AUTO-MCNC: NEGATIVE MG/DL
KETONES UR STRIP.AUTO-MCNC: NEGATIVE MG/DL
LEUKOCYTE ESTERASE UR QL STRIP.AUTO: NEGATIVE
NITRITE UR QL STRIP.AUTO: NEGATIVE
PH UR STRIP.AUTO: 6 [PH] (ref 5–8)
PROT UR QL STRIP: NEGATIVE MG/DL
RBC UR QL AUTO: NEGATIVE
SP GR UR STRIP.AUTO: 1.02
UROBILINOGEN UR STRIP-MCNC: 0.2 MG/DL

## 2018-11-01 ENCOUNTER — TELEPHONE (OUTPATIENT)
Dept: MEDICAL GROUP | Facility: PHYSICIAN GROUP | Age: 83
End: 2018-11-01

## 2018-11-01 DIAGNOSIS — R93.89 ABNORMAL CXR (CHEST X-RAY): ICD-10-CM

## 2018-11-01 NOTE — TELEPHONE ENCOUNTER
Please call the patient's daughter (Yanelis)  Chest XR shows opacity in the left lung that the radiologist feels needs to be evaluated by CT because it is larger then last checked. No pneumonia. I have ordered the chest CT. Can be done at [facility], please have her call to schedule this. Please fax this order to [facility] for them. Thanks!

## 2018-11-01 NOTE — TELEPHONE ENCOUNTER
CT scan faxed over to [phone] and daughter notified. She said she will call to schedule that today

## 2018-11-06 ENCOUNTER — OFFICE VISIT (OUTPATIENT)
Dept: URGENT CARE | Facility: PHYSICIAN GROUP | Age: 83
End: 2018-11-06
Payer: MEDICARE

## 2018-11-06 VITALS
BODY MASS INDEX: 20.83 KG/M2 | TEMPERATURE: 97.9 F | HEIGHT: 65 IN | HEART RATE: 92 BPM | RESPIRATION RATE: 16 BRPM | WEIGHT: 125 LBS | SYSTOLIC BLOOD PRESSURE: 144 MMHG | DIASTOLIC BLOOD PRESSURE: 82 MMHG | OXYGEN SATURATION: 95 %

## 2018-11-06 DIAGNOSIS — R06.02 SOB (SHORTNESS OF BREATH): ICD-10-CM

## 2018-11-06 PROCEDURE — 99213 OFFICE O/P EST LOW 20 MIN: CPT | Performed by: PHYSICIAN ASSISTANT

## 2018-11-06 NOTE — PROGRESS NOTES
Chief Complaint   Patient presents with   • Shortness of Breath       HISTORY OF PRESENT ILLNESS: Patient is a 87 y.o. female who presents today because she has been having shortness of breath.  This is been going on for weeks if not months, she has been seen in the urgent care multiple times, also been seen by her primary care provider who ordered a chest x-ray and there is some type of left lower lobe consolidation and a CT was recommended.  The primary care provider did order a CT, but the patient has not gotten that done yet.  She woke up this morning with shortness of breath, called 911 and the EMTs came to her home and evaluated her but by the time they got there she had coughed quite a bit and was feeling somewhat better.  They recommended follow-up with the ER or urgent care.  However I do not have the facilities in this urgent care to thoroughly evaluate that and explained that to the patient.  She is having some shortness of breath but denies any chest pain.    Patient Active Problem List    Diagnosis Date Noted   • Chronic fatigue 10/30/2018   • Counseling regarding end of life decision making 08/16/2018   • Dementia without behavioral disturbance 06/14/2018   • Anxiety 12/13/2017   • History of breast cancer 12/13/2017   • Hypertension    • High cholesterol    • Hypothyroidism        Allergies:Patient has no known allergies.    Current Outpatient Prescriptions Ordered in [facility]   Medication Sig Dispense Refill   • levothyroxine (SYNTHROID) 75 MCG Tab Take 1 Tab by mouth Every morning on an empty stomach. 30 Tab 1   • aliskiren (TEKTURNA) 300 MG tablet Take 0.5 Tabs by mouth every day. TAKE ONE TABLET BY MOUTH EVERY DAY 45 Tab 1   • Ascorbic Acid (YAW-C PO) Take  by mouth.     • cyanocobalamin (VITAMIN B-12) 100 MCG Tab Take 100 mcg by mouth every day.     • azithromycin (ZITHROMAX) 250 MG Tab Follow package directions (Patient not taking: Reported on 10/30/2018) 6 Tab 0   • MethylPREDNISolone (MEDROL  DOSEPAK) 4 MG Tablet Therapy Pack Take 1 Tab by mouth See Admin Instructions. (Patient not taking: Reported on 10/30/2018) 21 Tab 0   • albuterol 108 (90 Base) MCG/ACT Aero Soln inhalation aerosol Inhale 2 Puffs by mouth every four hours as needed. 1 Inhaler 0   • memantine (NAMENDA) 5 MG Tab Take 1 Tab by mouth 2 times a day. 180 Tab 1   • donepezil (ARICEPT) 5 MG Tab Take 2 Tabs by mouth every evening. 60 Tab 11   • lovastatin (MEVACOR) 40 MG tablet Take 1 Tab by mouth every day. 90 Tab 1   • aspirin 81 MG tablet Take 81 mg by mouth every day.     • Melatonin 10 MG Tab Take  by mouth.     • Lactobacillus (ACIDOPHOLUS PO) Take  by mouth. [id number]      • lorazepam (ATIVAN) 0.5 MG TABS Take 0.5 mg by mouth 3 times a day as needed.     • albuterol (VENTOLIN OR PROVENTIL) 108 (90 BASE) MCG/ACT AERS Inhale 2 Puffs by mouth every 6 hours as needed.       No current Epic-ordered facility-administered medications on file.        Past Medical History:   Diagnosis Date   • CAD (coronary artery disease)    • Cancer (HCC)     breast   • High cholesterol    • Hypertension    • Hypothyroidism    • Pancreatitis, acute     resolved       Social History   Substance Use Topics   • Smoking status: Former Smoker     Types: Cigarettes   • Smokeless tobacco: Never Used      Comment: 10 pk/yr hs   • Alcohol use 2.4 oz/week     4 Glasses of wine per week      Comment: 4-8 oz/day       Family Status   Relation Status   • Mo  at age 80        flu   • Fa  at age 80s   • Bro Alive     Family History   Problem Relation Age of Onset   • Dementia Brother        ROS:  Review of Systems   Constitutional: Negative for fever, chills, weight loss and malaise/fatigue.   HENT: Negative for ear pain, nosebleeds, congestion, sore throat and neck pain.    Eyes: Negative for blurred vision.   Respiratory: Negative for cough, sputum production, positive for shortness of breath and no wheezing.    Cardiovascular: Negative for chest pain,  "palpitations, orthopnea and leg swelling.   Gastrointestinal: Negative for heartburn, nausea, vomiting and abdominal pain.   Genitourinary: Negative for dysuria, urgency and frequency.     Exam:  Blood pressure 144/82, pulse 92, temperature 36.6 °C (97.9 °F), temperature source Temporal, resp. rate 16, height 1.638 m (5' 4.5\"), weight 56.7 kg (125 lb), SpO2 95 %, not currently breastfeeding.  General:  Well nourished, well developed female in NAD  Head:Normocephalic, atraumatic  Eyes: PERRLA, EOM within normal limits, no conjunctival injection, no scleral icterus, visual fields and acuity grossly intact.  Nose: Symmetrical without tenderness, no discharge.  Mouth: reasonable hygiene, no erythema exudates or tonsillar enlargement.  Neck: no masses, range of motion within normal limits, no tracheal deviation. No obvious thyroid enlargement.  Extremities: no clubbing, cyanosis, or edema.    Please note that this dictation was created using voice recognition software. I have made every reasonable attempt to correct obvious errors, but I expect that there are errors of grammar and possibly content that I did not discover before finalizing the note.    Assessment/Plan:  1. SOB (shortness of breath)     Vital signs stable.   is here with her, states that he will take her to the emergency room for further evaluation and treatment.    Followup with primary care in the next 7-10 days if not significantly improving, return to the urgent care or go to the emergency room sooner for any worsening of symptoms.       "

## 2018-11-08 ENCOUNTER — HOSPITAL ENCOUNTER (OUTPATIENT)
Dept: LAB | Facility: MEDICAL CENTER | Age: 83
End: 2018-11-08
Attending: NURSE PRACTITIONER
Payer: MEDICARE

## 2018-11-08 DIAGNOSIS — D72.829 LEUKOCYTOSIS, UNSPECIFIED TYPE: ICD-10-CM

## 2018-11-08 DIAGNOSIS — E87.1 HYPONATREMIA: ICD-10-CM

## 2018-11-08 DIAGNOSIS — R53.82 CHRONIC FATIGUE: ICD-10-CM

## 2018-11-08 LAB
ANION GAP SERPL CALC-SCNC: 9 MMOL/L (ref 0–11.9)
BASOPHILS # BLD AUTO: 1 % (ref 0–1.8)
BASOPHILS # BLD: 0.06 K/UL (ref 0–0.12)
BUN SERPL-MCNC: 5 MG/DL (ref 8–22)
CALCIUM SERPL-MCNC: 10 MG/DL (ref 8.5–10.5)
CHLORIDE SERPL-SCNC: 96 MMOL/L (ref 96–112)
CO2 SERPL-SCNC: 29 MMOL/L (ref 20–33)
CREAT SERPL-MCNC: 0.61 MG/DL (ref 0.5–1.4)
EOSINOPHIL # BLD AUTO: 0.08 K/UL (ref 0–0.51)
EOSINOPHIL NFR BLD: 1.3 % (ref 0–6.9)
ERYTHROCYTE [DISTWIDTH] IN BLOOD BY AUTOMATED COUNT: 46 FL (ref 35.9–50)
FASTING STATUS PATIENT QL REPORTED: NORMAL
GLUCOSE SERPL-MCNC: 107 MG/DL (ref 65–99)
HCT VFR BLD AUTO: 39.4 % (ref 37–47)
HGB BLD-MCNC: 13.2 G/DL (ref 12–16)
IMM GRANULOCYTES # BLD AUTO: 0.02 K/UL (ref 0–0.11)
IMM GRANULOCYTES NFR BLD AUTO: 0.3 % (ref 0–0.9)
LYMPHOCYTES # BLD AUTO: 1.17 K/UL (ref 1–4.8)
LYMPHOCYTES NFR BLD: 18.9 % (ref 22–41)
MCH RBC QN AUTO: 32.4 PG (ref 27–33)
MCHC RBC AUTO-ENTMCNC: 33.5 G/DL (ref 33.6–35)
MCV RBC AUTO: 96.6 FL (ref 81.4–97.8)
MONOCYTES # BLD AUTO: 0.45 K/UL (ref 0–0.85)
MONOCYTES NFR BLD AUTO: 7.3 % (ref 0–13.4)
NEUTROPHILS # BLD AUTO: 4.41 K/UL (ref 2–7.15)
NEUTROPHILS NFR BLD: 71.2 % (ref 44–72)
NRBC # BLD AUTO: 0 K/UL
NRBC BLD-RTO: 0 /100 WBC
PLATELET # BLD AUTO: 298 K/UL (ref 164–446)
PMV BLD AUTO: 8.5 FL (ref 9–12.9)
POTASSIUM SERPL-SCNC: 4.1 MMOL/L (ref 3.6–5.5)
RBC # BLD AUTO: 4.08 M/UL (ref 4.2–5.4)
SODIUM SERPL-SCNC: 134 MMOL/L (ref 135–145)
WBC # BLD AUTO: 6.2 K/UL (ref 4.8–10.8)

## 2018-11-08 PROCEDURE — 80048 BASIC METABOLIC PNL TOTAL CA: CPT

## 2018-11-08 PROCEDURE — 85025 COMPLETE CBC W/AUTO DIFF WBC: CPT

## 2018-11-08 PROCEDURE — 36415 COLL VENOUS BLD VENIPUNCTURE: CPT

## 2018-11-13 ENCOUNTER — OFFICE VISIT (OUTPATIENT)
Dept: MEDICAL GROUP | Facility: PHYSICIAN GROUP | Age: 83
End: 2018-11-13
Payer: MEDICARE

## 2018-11-13 VITALS
TEMPERATURE: 97.9 F | RESPIRATION RATE: 16 BRPM | SYSTOLIC BLOOD PRESSURE: 150 MMHG | BODY MASS INDEX: 20.96 KG/M2 | DIASTOLIC BLOOD PRESSURE: 90 MMHG | OXYGEN SATURATION: 95 % | HEIGHT: 65 IN | WEIGHT: 125.8 LBS | HEART RATE: 84 BPM

## 2018-11-13 DIAGNOSIS — J43.9 PULMONARY EMPHYSEMA, UNSPECIFIED EMPHYSEMA TYPE (HCC): ICD-10-CM

## 2018-11-13 DIAGNOSIS — R53.82 CHRONIC FATIGUE: ICD-10-CM

## 2018-11-13 PROCEDURE — 99214 OFFICE O/P EST MOD 30 MIN: CPT | Performed by: NURSE PRACTITIONER

## 2018-11-13 RX ORDER — IPRATROPIUM BROMIDE AND ALBUTEROL SULFATE 2.5; .5 MG/3ML; MG/3ML
3 SOLUTION RESPIRATORY (INHALATION) 4 TIMES DAILY
Qty: 120 BULLET | Refills: 5 | Status: SHIPPED | OUTPATIENT
Start: 2018-11-13 | End: 2018-12-06

## 2018-11-13 NOTE — ASSESSMENT & PLAN NOTE
Patient was diagnosed in ER following CXR suggestive of emphysema. CT was normal. Does have mild intermittent exertional shortness or breath but admits this has improved. SPO2 normal on RA. Does find benefit from husbands albuterol in the morning along with mucolytic but finds it irritating and fatiguing to cough up mucous every am. Mucous is clear, once its gone she feels much better. No fevers, tachypnea, tachycardia. She is not on any inhalers.

## 2018-11-13 NOTE — ASSESSMENT & PLAN NOTE
Patient is an 87-year-old female who is here for follow-up of labs in the setting of chronic fatigue.  She was seen in the ER and she was seen in the urgent care and ER for fatigue, shortness of breath, cough.  Treated with 2 rounds of antibiotics due to adventitious breath sounds and low-grade fever.  Chest x-ray showed left lung opacity, follow-up CT was normal, radiology suggests that this was shadowing from the left breast.  WBCs are now normal.  Her daughter discontinued her hydrochlorothiazide which I never prescribed for her, it appears that she was taking this on her own accord, it was a leftover prescription, subsequently her potassium is normalized.  She is no longer using Namenda.  Additionally, she was found to have elevated TSH despite levothyroxine 50 mcg daily, this was optimized to 75 mcg daily and she is due for repeat labs in 2 weeks. Sleeping well, eating and drinking well, both the patient, her  and her daughter tell me that she seems to be significantly better than our last visit.

## 2018-11-13 NOTE — PROGRESS NOTES
[facility]  Primary Care Office Visit - Problem-Oriented        History:     Cortney Sullivan is a 87 y.o. female who is here today to discuss Other (Fv from ER/ out of breath in the morning/ labs fv)      Chronic fatigue  Patient is an 87-year-old female who is here for follow-up of labs in the setting of chronic fatigue.  She was seen in the ER and she was seen in the urgent care and ER for fatigue, shortness of breath, cough.  Treated with 2 rounds of antibiotics due to adventitious breath sounds and low-grade fever.  Chest x-ray showed left lung opacity, follow-up CT was normal, radiology suggests that this was shadowing from the left breast.  WBCs are now normal.  Her daughter discontinued her hydrochlorothiazide which I never prescribed for her, it appears that she was taking this on her own accord, it was a leftover prescription, subsequently her potassium is normalized.  She is no longer using Namenda.  Additionally, she was found to have elevated TSH despite levothyroxine 50 mcg daily, this was optimized to 75 mcg daily and she is due for repeat labs in 2 weeks. Sleeping well, eating and drinking well, both the patient, her  and her daughter tell me that she seems to be significantly better than our last visit.    Pulmonary emphysema (HCC)  Patient was diagnosed in ER following CXR suggestive of emphysema. CT was normal. Does have mild intermittent exertional shortness or breath but admits this has improved. SPO2 normal on RA. Does find benefit from husbands albuterol in the morning along with mucolytic but finds it irritating and fatiguing to cough up mucous every am. Mucous is clear, once its gone she feels much better. No fevers, tachypnea, tachycardia. She is not on any inhalers.         Past Medical History:   Diagnosis Date   • CAD (coronary artery disease)    • Cancer (HCC)     breast   • High cholesterol    • Hypertension    • Hypothyroidism    • Pancreatitis, acute     resolved      Past Surgical History:   Procedure Laterality Date   • LUMPECTOMY      & radiation only     Social History     Social History   • Marital status: Unknown     Spouse name: N/A   • Number of children: N/A   • Years of education: N/A     Occupational History   • Not on file.     Social History Main Topics   • Smoking status: Former Smoker     Types: Cigarettes   • Smokeless tobacco: Never Used      Comment: 10 pk/yr hs   • Alcohol use 2.4 oz/week     4 Glasses of wine per week      Comment: 4-8 oz/day   • Drug use: No   • Sexual activity: Yes     Partners: Male     Other Topics Concern   • Not on file     Social History Narrative   • No narrative on file     History   Smoking Status   • Former Smoker   • Types: Cigarettes   Smokeless Tobacco   • Never Used     Comment: 10 pk/yr hs     Family History   Problem Relation Age of Onset   • Dementia Brother      No Known Allergies    Problem List:     Patient Active Problem List    Diagnosis Date Noted   • Pulmonary emphysema (HCC) 11/13/2018   • Chronic fatigue 10/30/2018   • Counseling regarding end of life decision making 08/16/2018   • Dementia without behavioral disturbance 06/14/2018   • Anxiety 12/13/2017   • History of breast cancer 12/13/2017   • Hypertension    • High cholesterol    • Hypothyroidism          Medications:     Current Outpatient Prescriptions:   •  ipratropium-albuterol (DUONEB) 0.5-2.5 (3) MG/3ML nebulizer solution, 3 mL by Nebulization route 4 times a day., Disp: 120 Bullet, Rfl: 5  •  levothyroxine (SYNTHROID) 75 MCG Tab, Take 1 Tab by mouth Every morning on an empty stomach., Disp: 30 Tab, Rfl: 1  •  aliskiren (TEKTURNA) 300 MG tablet, Take 0.5 Tabs by mouth every day. TAKE ONE TABLET BY MOUTH EVERY DAY, Disp: 45 Tab, Rfl: 1  •  Ascorbic Acid (YAW-C PO), Take  by mouth., Disp: , Rfl:   •  cyanocobalamin (VITAMIN B-12) 100 MCG Tab, Take 100 mcg by mouth every day., Disp: , Rfl:   •  Melatonin 10 MG Tab, Take  by mouth., Disp: , Rfl:   •   "Lactobacillus (ACIDOPHOLUS PO), Take  by mouth. [id number] , Disp: , Rfl:   •  azithromycin (ZITHROMAX) 250 MG Tab, Follow package directions (Patient not taking: Reported on 11/13/2018), Disp: 6 Tab, Rfl: 0  •  MethylPREDNISolone (MEDROL DOSEPAK) 4 MG Tablet Therapy Pack, Take 1 Tab by mouth See Admin Instructions. (Patient not taking: Reported on 11/13/2018), Disp: 21 Tab, Rfl: 0  •  albuterol 108 (90 Base) MCG/ACT Aero Soln inhalation aerosol, Inhale 2 Puffs by mouth every four hours as needed., Disp: 1 Inhaler, Rfl: 0  •  memantine (NAMENDA) 5 MG Tab, Take 1 Tab by mouth 2 times a day. (Patient not taking: Reported on 11/13/2018), Disp: 180 Tab, Rfl: 1  •  donepezil (ARICEPT) 5 MG Tab, Take 2 Tabs by mouth every evening. (Patient not taking: Reported on 11/13/2018), Disp: 60 Tab, Rfl: 11  •  lovastatin (MEVACOR) 40 MG tablet, Take 1 Tab by mouth every day. (Patient not taking: Reported on 11/13/2018), Disp: 90 Tab, Rfl: 1  •  aspirin 81 MG tablet, Take 81 mg by mouth every day., Disp: , Rfl:   •  LORazepam (ATIVAN) 0.5 MG Tab, Take 0.5 mg by mouth 3 times a day as needed., Disp: , Rfl:       Review of Systems:     Pertinent positives as per HPI, all other systems reviewed and WNL     Physical Assessment:     VS: /90 (BP Location: Left arm, Patient Position: Sitting, BP Cuff Size: Adult)   Pulse 84   Temp 36.6 °C (97.9 °F) (Temporal)   Resp 16   Ht 1.638 m (5' 4.5\")   Wt 57.1 kg (125 lb 12.8 oz)   SpO2 95%   BMI 21.26 kg/m²     General: Well-developed, well-nourished, female     Head: PERRL, EOMI. Normocephalic. No facial asymmetry noted.  Neck: Neck supple, no thyromegaly  Cardiovasc: RRR, no MRG. No thrills or bruits. Pulses 2+ and symmetric at all distal extremities.  Pulmonary: Lungs clear bilaterally.  Normal respiratory effort. No wheeze or crackles.   Extremities: No edema  Neuro: Alert and oriented to person and place  Skin:No rashes noted. Skin warm, dry, intact    Lymph: No cervical, pre- or " post-auricular, submandibular, occipital lymphadenopathy.    Psych: Dressed appropriately for the weather, pleasant and conversant.  Affect, mood & judgment appropriate.      Assessment/Plan:   Cortney was seen today for other.    Diagnoses and all orders for this visit:    Pulmonary emphysema, unspecified emphysema type (HCC), somewhat uncontrolled   - clinical picture suggests COPD, will have her try DUONEB QID. Recommend warm humidier at the bedside. Continue mucolytic, pulmonary toilet.   -     ipratropium-albuterol (DUONEB) 0.5-2.5 (3) MG/3ML nebulizer solution; 3 mL by Nebulization route 4 times a day.  -     DME Nebulizer  -     DME Nebulizer Supplies    Chronic fatigue, improving   - significant improvement, reviewed all labs and diagnostics with patient and family, very reassuring. Continue cautious observation.       Patient is agreeable to the above plan and voiced understanding. All questions answered.     Please note that this dictation was created using voice recognition software. I have made every reasonable attempt to correct obvious errors, but I expect that there are errors of grammar and possibly content that I did not discover before finalizing the note.      LENI Gonzalez  11/13/2018, 1:15 PM

## 2018-11-29 ENCOUNTER — HOSPITAL ENCOUNTER (OUTPATIENT)
Dept: LAB | Facility: MEDICAL CENTER | Age: 83
End: 2018-11-29
Attending: NURSE PRACTITIONER
Payer: MEDICARE

## 2018-11-29 DIAGNOSIS — E03.9 ACQUIRED HYPOTHYROIDISM: ICD-10-CM

## 2018-11-29 LAB
T4 FREE SERPL-MCNC: 1.15 NG/DL (ref 0.53–1.43)
TSH SERPL DL<=0.005 MIU/L-ACNC: 0.87 UIU/ML (ref 0.38–5.33)

## 2018-11-29 PROCEDURE — 84439 ASSAY OF FREE THYROXINE: CPT

## 2018-11-29 PROCEDURE — 84443 ASSAY THYROID STIM HORMONE: CPT

## 2018-11-29 PROCEDURE — 36415 COLL VENOUS BLD VENIPUNCTURE: CPT

## 2018-12-06 ENCOUNTER — OFFICE VISIT (OUTPATIENT)
Dept: MEDICAL GROUP | Facility: PHYSICIAN GROUP | Age: 83
End: 2018-12-06
Payer: MEDICARE

## 2018-12-06 VITALS
SYSTOLIC BLOOD PRESSURE: 104 MMHG | DIASTOLIC BLOOD PRESSURE: 56 MMHG | OXYGEN SATURATION: 95 % | BODY MASS INDEX: 20.79 KG/M2 | RESPIRATION RATE: 18 BRPM | WEIGHT: 123 LBS | TEMPERATURE: 98 F | HEART RATE: 80 BPM

## 2018-12-06 DIAGNOSIS — J43.9 PULMONARY EMPHYSEMA, UNSPECIFIED EMPHYSEMA TYPE (HCC): ICD-10-CM

## 2018-12-06 DIAGNOSIS — E03.9 ACQUIRED HYPOTHYROIDISM: ICD-10-CM

## 2018-12-06 PROCEDURE — 99214 OFFICE O/P EST MOD 30 MIN: CPT | Performed by: NURSE PRACTITIONER

## 2018-12-06 RX ORDER — INHALER, ASSIST DEVICES
SPACER (EA) MISCELLANEOUS
COMMUNITY
Start: 2018-09-27 | End: 2019-03-04

## 2018-12-06 NOTE — PROGRESS NOTES
[place] [facility]  Primary Care Office Visit - Problem-Oriented        History:     Cortney Sullivan is a 87 y.o. female who is here today to discuss Results (thyroid results,) and Medication Problem (discuss neb medication)      Hypothyroidism  Patient is an 87-year-old female who is here for follow-up of hypothyroidism.  Previous TSH was elevated at 4.9, T4 0.99.  Patient was having significant fatigue, waking up and going back to sleep shortly after arousing.  Not doing housework, cooking.  Dose was increased to levothyroxine 75 mcg daily.  Patient reports considerable improvement in her fatigue,  and daughter corroborate this.  No adverse effects of medication.  She will continue this and we will have her follow-up in 3 months, repeat labs in 6 months.    Pulmonary emphysema (HCC)  Patient is an 87-year-old female with pulmonary emphysema, she continues on DuoNeb daily which does help with her shortness of breath and cough.  Unfortunately, albuterol has caused some bothersome side effects to include racing heart and jitteriness.        Past Medical History:   Diagnosis Date   • CAD (coronary artery disease)    • Cancer (HCC)     breast   • High cholesterol    • Hypertension    • Hypothyroidism    • Pancreatitis, acute     resolved     Past Surgical History:   Procedure Laterality Date   • LUMPECTOMY      & radiation only     Social History     Social History   • Marital status:      Spouse name: N/A   • Number of children: N/A   • Years of education: N/A     Occupational History   • Not on file.     Social History Main Topics   • Smoking status: Former Smoker     Types: Cigarettes   • Smokeless tobacco: Never Used      Comment: 10 pk/yr hs   • Alcohol use 2.4 oz/week     4 Glasses of wine per week      Comment: 4-8 oz/day   • Drug use: No   • Sexual activity: Yes     Partners: Male     Other Topics Concern   • Not on file     Social History Narrative   • No narrative on file     History   Smoking  Status   • Former Smoker   • Types: Cigarettes   Smokeless Tobacco   • Never Used     Comment: 10 pk/yr hs     Family History   Problem Relation Age of Onset   • Dementia Brother      No Known Allergies    Problem List:     Patient Active Problem List    Diagnosis Date Noted   • Pulmonary emphysema (HCC) 11/13/2018   • Chronic fatigue 10/30/2018   • Counseling regarding end of life decision making 08/16/2018   • Dementia without behavioral disturbance 06/14/2018   • Anxiety 12/13/2017   • History of breast cancer 12/13/2017   • Hypertension    • High cholesterol    • Hypothyroidism          Medications:     Current Outpatient Prescriptions:   •  Tiotropium Bromide Monohydrate 2.5 MCG/ACT Aero Soln, Inhale 2 Puffs by mouth every day., Disp: 3 Inhaler, Rfl: 3  •  Spacer/Aero-Holding Chambers (MICROCHAMBER) Misc, , Disp: , Rfl:   •  levothyroxine (SYNTHROID) 75 MCG Tab, Take 1 Tab by mouth Every morning on an empty stomach., Disp: 30 Tab, Rfl: 1  •  aliskiren (TEKTURNA) 300 MG tablet, Take 0.5 Tabs by mouth every day. TAKE ONE TABLET BY MOUTH EVERY DAY, Disp: 45 Tab, Rfl: 1  •  Ascorbic Acid (YAW-C PO), Take  by mouth., Disp: , Rfl:   •  cyanocobalamin (VITAMIN B-12) 100 MCG Tab, Take 100 mcg by mouth every day., Disp: , Rfl:   •  albuterol 108 (90 Base) MCG/ACT Aero Soln inhalation aerosol, Inhale 2 Puffs by mouth every four hours as needed., Disp: 1 Inhaler, Rfl: 0  •  aspirin 81 MG tablet, Take 81 mg by mouth every day., Disp: , Rfl:   •  Melatonin 10 MG Tab, Take  by mouth., Disp: , Rfl:   •  Lactobacillus (ACIDOPHOLUS PO), Take  by mouth. [id number] , Disp: , Rfl:   •  LORazepam (ATIVAN) 0.5 MG Tab, Take 0.5 mg by mouth 3 times a day as needed., Disp: , Rfl:       Review of Systems:     Pertinent positives as per HPI, all other systems reviewed and WNL   Physical Assessment:     VS: /56 (BP Location: Left arm, Patient Position: Sitting)   Pulse 80   Temp 36.7 °C (98 °F) (Temporal)   Resp 18   Wt 55.8 kg  (123 lb)   SpO2 95%   BMI 20.79 kg/m²     General: Well-developed, well-nourished, female     Head: PERRL, EOMI. Normocephalic. No facial asymmetry noted.  Cardiovasc: RRR, no MRG. No thrills or bruits. Pulses 2+ and symmetric at all distal extremities.  Pulmonary: Lungs clear bilaterally.  Normal respiratory effort. No wheeze or crackles.   Neuro: Alert and oriented, CNs II-XII intact, no focal deficits.  Skin:No rashes noted. Skin warm, dry, intact    Psych: Dressed appropriately for the weather, pleasant and conversant.  Affect, mood & judgment appropriate.      Assessment/Plan:   Cortney was seen today for results and medication problem.    Diagnoses and all orders for this visit:    Pulmonary emphysema, unspecified emphysema type (HCC), controlled   - start spiriva daily, albuterol as needed, can switch to xoponex if still having bothersome jitters and racing heart, patient declines rx for this today. Follow up in 3 months sooner if needed.   -     Tiotropium Bromide Monohydrate 2.5 MCG/ACT Aero Soln; Inhale 2 Puffs by mouth every day.    Acquired hypothyroidism, much improved with increased dose of levo, monitor in 3 months, labs in 6 months       Patient is agreeable to the above plan and voiced understanding. All questions answered.     Please note that this dictation was created using voice recognition software. I have made every reasonable attempt to correct obvious errors, but I expect that there are errors of grammar and possibly content that I did not discover before finalizing the note.      LENI Gonzalez  12/6/2018, 9:21 AM

## 2018-12-06 NOTE — PROGRESS NOTES
Cancelled RX spiriva at [facility] with Jennifer. Veronica Vázquez, Med Ass't

## 2018-12-06 NOTE — ASSESSMENT & PLAN NOTE
Patient is an 87-year-old female with pulmonary emphysema, she continues on DuoNeb daily which does help with her shortness of breath and cough.  Unfortunately, albuterol has caused some bothersome side effects to include racing heart and jitteriness.

## 2018-12-06 NOTE — ASSESSMENT & PLAN NOTE
Patient is an 87-year-old female who is here for follow-up of hypothyroidism.  Previous TSH was elevated at 4.9, T4 0.99.  Patient was having significant fatigue, waking up and going back to sleep shortly after arousing.  Not doing housework, cooking.  Dose was increased to levothyroxine 75 mcg daily.  Patient reports considerable improvement in her fatigue,  and daughter corroborate this.  No adverse effects of medication.  She will continue this and we will have her follow-up in 3 months, repeat labs in 6 months.

## 2019-01-29 ENCOUNTER — TELEPHONE (OUTPATIENT)
Dept: URGENT CARE | Facility: PHYSICIAN GROUP | Age: 84
End: 2019-01-29

## 2019-01-29 DIAGNOSIS — I10 ESSENTIAL HYPERTENSION: ICD-10-CM

## 2019-01-29 RX ORDER — LISINOPRIL 20 MG/1
20 TABLET ORAL DAILY
Qty: 90 TAB | Refills: 0 | Status: SHIPPED | OUTPATIENT
Start: 2019-01-29 | End: 2019-02-19 | Stop reason: SDUPTHER

## 2019-01-29 NOTE — TELEPHONE ENCOUNTER
1. Caller Name: ijeoma colon                                         Call Back Number: [phone]      Patient approves a detailed voicemail message: yes    medication     Patient  came in to the office and states that they are unable to afford the 60 dollars that [facility] is charging for the tekturna tabs 300 mg.     He would like to know if you could find an alternative that may be cheaper and send it [facility] the [facility].    He states that she was on lisinopril before if that may be an option.

## 2019-02-19 ENCOUNTER — OFFICE VISIT (OUTPATIENT)
Dept: MEDICAL GROUP | Facility: PHYSICIAN GROUP | Age: 84
End: 2019-02-19
Payer: MEDICARE

## 2019-02-19 VITALS
HEART RATE: 77 BPM | RESPIRATION RATE: 12 BRPM | OXYGEN SATURATION: 94 % | BODY MASS INDEX: 20.83 KG/M2 | HEIGHT: 65 IN | TEMPERATURE: 97.8 F | SYSTOLIC BLOOD PRESSURE: 122 MMHG | DIASTOLIC BLOOD PRESSURE: 68 MMHG | WEIGHT: 125 LBS

## 2019-02-19 DIAGNOSIS — E03.9 ACQUIRED HYPOTHYROIDISM: ICD-10-CM

## 2019-02-19 DIAGNOSIS — Z23 NEED FOR VACCINATION: ICD-10-CM

## 2019-02-19 DIAGNOSIS — J43.9 PULMONARY EMPHYSEMA, UNSPECIFIED EMPHYSEMA TYPE (HCC): ICD-10-CM

## 2019-02-19 DIAGNOSIS — F03.90 DEMENTIA WITHOUT BEHAVIORAL DISTURBANCE, UNSPECIFIED DEMENTIA TYPE: Primary | ICD-10-CM

## 2019-02-19 DIAGNOSIS — I10 ESSENTIAL HYPERTENSION: ICD-10-CM

## 2019-02-19 DIAGNOSIS — R06.02 SOB (SHORTNESS OF BREATH): ICD-10-CM

## 2019-02-19 PROCEDURE — 99215 OFFICE O/P EST HI 40 MIN: CPT | Mod: 25 | Performed by: NURSE PRACTITIONER

## 2019-02-19 PROCEDURE — 86580 TB INTRADERMAL TEST: CPT | Performed by: NURSE PRACTITIONER

## 2019-02-19 PROCEDURE — G0009 ADMIN PNEUMOCOCCAL VACCINE: HCPCS | Performed by: NURSE PRACTITIONER

## 2019-02-19 PROCEDURE — 90732 PPSV23 VACC 2 YRS+ SUBQ/IM: CPT | Performed by: NURSE PRACTITIONER

## 2019-02-19 RX ORDER — LEVOTHYROXINE SODIUM 0.07 MG/1
75 TABLET ORAL
Qty: 90 TAB | Refills: 3 | Status: SHIPPED | OUTPATIENT
Start: 2019-02-19 | End: 2019-02-19 | Stop reason: SDUPTHER

## 2019-02-19 RX ORDER — LISINOPRIL 20 MG/1
20 TABLET ORAL DAILY
Qty: 90 TAB | Refills: 3 | Status: SHIPPED
Start: 2019-02-19 | End: 2019-02-19 | Stop reason: SDUPTHER

## 2019-02-19 RX ORDER — CHOLECALCIFEROL (VITAMIN D3) 50 MCG
2000 TABLET ORAL DAILY
COMMUNITY

## 2019-02-19 RX ORDER — LISINOPRIL 20 MG/1
20 TABLET ORAL DAILY
Qty: 90 TAB | Refills: 3 | Status: SHIPPED | OUTPATIENT
Start: 2019-02-19 | End: 2021-06-16 | Stop reason: SDUPTHER

## 2019-02-19 RX ORDER — LEVOTHYROXINE SODIUM 0.07 MG/1
75 TABLET ORAL
Qty: 90 TAB | Refills: 3 | Status: SHIPPED
Start: 2019-02-19 | End: 2019-02-19 | Stop reason: SDUPTHER

## 2019-02-19 RX ORDER — LISINOPRIL 20 MG/1
20 TABLET ORAL DAILY
Qty: 90 TAB | Refills: 3 | Status: SHIPPED | OUTPATIENT
Start: 2019-02-19 | End: 2019-02-19 | Stop reason: SDUPTHER

## 2019-02-19 RX ORDER — LEVOTHYROXINE SODIUM 0.07 MG/1
75 TABLET ORAL
Qty: 90 TAB | Refills: 3 | Status: SHIPPED | OUTPATIENT
Start: 2019-02-19 | End: 2021-10-21 | Stop reason: SDUPTHER

## 2019-02-19 RX ORDER — ALBUTEROL SULFATE 90 UG/1
2 AEROSOL, METERED RESPIRATORY (INHALATION) EVERY 4 HOURS PRN
Qty: 1 INHALER | Refills: 5 | Status: SHIPPED
Start: 2019-02-19 | End: 2022-04-20

## 2019-02-19 ASSESSMENT — PATIENT HEALTH QUESTIONNAIRE - PHQ9: CLINICAL INTERPRETATION OF PHQ2 SCORE: 0

## 2019-02-19 NOTE — ASSESSMENT & PLAN NOTE
Patient is an 88-year-old female who is here for paperwork, she will be moving to assisted living. She recently lost her  and seems to be in good spirits. She does have dementia, no longer on Namenda or Aricept as these were not beneficial.  She does have hypothyroidism which is well controlled on levothyroxine 75 mcg daily.  Also has hypertension which is well controlled with lisinopril 20 mg daily.  Earlier this year she was diagnosed with pulmonary emphysema following prolonged cough/upper respiratory infection, she is not using any rescue or maintenance inhalers, he has not had any significant shortness of breath or wheeze.  She is here today for paperwork to move to the [facility] assisted living.  She needs to have PPD placed x2.  We will update her vaccines today.  I will also prescribe 1 years worth of medications, these will be managed by the [facility] staff.

## 2019-02-19 NOTE — LETTER
February 20, 2019        Cortney Sullivan    Current Outpatient Prescriptions   Medication Sig Dispense Refill   • vitamin D (CHOLECALCIFEROL) 1000 UNIT Tab Take 1,000 Units by mouth every day.     • Multiple Vitamin (MULTI VITAMIN DAILY PO) Take  by mouth.     • albuterol 108 (90 Base) MCG/ACT Aero Soln inhalation aerosol Inhale 2 Puffs by mouth every four hours as needed. 1 Inhaler 5   • lisinopril (PRINIVIL) 20 MG Tab Take 1 Tab by mouth every day. 90 Tab 3   • levothyroxine (SYNTHROID) 75 MCG Tab Take 1 Tab by mouth Every morning on an empty stomach. 90 Tab 3   • Ascorbic Acid (YAW-C PO) Take  by mouth.     • Melatonin 10 MG Tab Take  by mouth.     • Spacer/Aero-Holding Chambers (MICROCHAMBER) Misc      • Tiotropium Bromide Monohydrate 2.5 MCG/ACT Aero Soln Inhale 2 Puffs by mouth every day. (Patient not taking: Reported on 2/19/2019) 3 Inhaler 3   • cyanocobalamin (VITAMIN B-12) 100 MCG Tab Take 100 mcg by mouth every day.     • aspirin 81 MG tablet Take 81 mg by mouth every day.     • Lactobacillus (ACIDOPHOLUS PO) Take  by mouth. [id number]      • LORazepam (ATIVAN) 0.5 MG Tab Take 0.5 mg by mouth 3 times a day as needed.       No current facility-administered medications for this visit.                              Andreia Madrid

## 2019-02-19 NOTE — PROGRESS NOTES
[facility]  Primary Care Office Visit - Problem-Oriented        History:     Cortney Sullivan is a 88 y.o. female who is here today to discuss Other (assisted living forms) and PPD Placement      Dementia without behavioral disturbance  Patient is an 88-year-old female who is here for paperwork, she will be moving to assisted living. She recently lost her  and seems to be in good spirits. She does have dementia, no longer on Namenda or Aricept as these were not beneficial.  She does have hypothyroidism which is well controlled on levothyroxine 75 mcg daily.  Also has hypertension which is well controlled with lisinopril 20 mg daily.  Earlier this year she was diagnosed with pulmonary emphysema following prolonged cough/upper respiratory infection, she is not using any rescue or maintenance inhalers, he has not had any significant shortness of breath or wheeze.  She is here today for paperwork to move to the [facility] assisted living.  She needs to have PPD placed x2.  We will update her vaccines today.  I will also prescribe 1 years worth of medications, these will be managed by the [facility] staff.        Past Medical History:   Diagnosis Date   • CAD (coronary artery disease)    • Cancer (HCC)     breast   • High cholesterol    • Hypertension    • Hypothyroidism    • Pancreatitis, acute     resolved     Past Surgical History:   Procedure Laterality Date   • LUMPECTOMY      & radiation only     Social History     Social History   • Marital status:      Spouse name: N/A   • Number of children: N/A   • Years of education: N/A     Occupational History   • Not on file.     Social History Main Topics   • Smoking status: Former Smoker     Types: Cigarettes   • Smokeless tobacco: Never Used      Comment: 10 pk/yr hs   • Alcohol use 2.4 oz/week     4 Glasses of wine per week      Comment: 4-8 oz/day   • Drug use: No   • Sexual activity: Yes     Partners: Male     Other Topics Concern   • Not on file      Social History Narrative   • No narrative on file     History   Smoking Status   • Former Smoker   • Types: Cigarettes   Smokeless Tobacco   • Never Used     Comment: 10 pk/yr hs     Family History   Problem Relation Age of Onset   • Dementia Brother      No Known Allergies    Problem List:     Patient Active Problem List    Diagnosis Date Noted   • Pulmonary emphysema (HCC) 11/13/2018   • Chronic fatigue 10/30/2018   • Counseling regarding end of life decision making 08/16/2018   • Dementia without behavioral disturbance 06/14/2018   • Anxiety 12/13/2017   • History of breast cancer 12/13/2017   • Hypertension    • High cholesterol    • Hypothyroidism          Medications:     Current Outpatient Prescriptions:   •  vitamin D (CHOLECALCIFEROL) 1000 UNIT Tab, Take 1,000 Units by mouth every day., Disp: , Rfl:   •  Multiple Vitamin (MULTI VITAMIN DAILY PO), Take  by mouth., Disp: , Rfl:   •  albuterol 108 (90 Base) MCG/ACT Aero Soln inhalation aerosol, Inhale 2 Puffs by mouth every four hours as needed., Disp: 1 Inhaler, Rfl: 5  •  lisinopril (PRINIVIL) 20 MG Tab, Take 1 Tab by mouth every day., Disp: 90 Tab, Rfl: 3  •  levothyroxine (SYNTHROID) 75 MCG Tab, Take 1 Tab by mouth Every morning on an empty stomach., Disp: 90 Tab, Rfl: 3  •  Ascorbic Acid (YAW-C PO), Take  by mouth., Disp: , Rfl:   •  Spacer/Aero-Holding Chambers (MICROCHAMBER) Misc, , Disp: , Rfl:   •  Tiotropium Bromide Monohydrate 2.5 MCG/ACT Aero Soln, Inhale 2 Puffs by mouth every day. (Patient not taking: Reported on 2/19/2019), Disp: 3 Inhaler, Rfl: 3  •  cyanocobalamin (VITAMIN B-12) 100 MCG Tab, Take 100 mcg by mouth every day., Disp: , Rfl:   •  aspirin 81 MG tablet, Take 81 mg by mouth every day., Disp: , Rfl:   •  Melatonin 10 MG Tab, Take  by mouth., Disp: , Rfl:   •  Lactobacillus (ACIDOPHOLUS PO), Take  by mouth. [id number] , Disp: , Rfl:   •  LORazepam (ATIVAN) 0.5 MG Tab, Take 0.5 mg by mouth 3 times a day as needed., Disp: , Rfl:  "      Review of Systems:     Pertinent positives as per HPI, all other systems reviewed and WNL     Physical Assessment:     VS: /68 (BP Location: Left arm, Patient Position: Sitting, BP Cuff Size: Adult)   Pulse 77   Temp 36.6 °C (97.8 °F)   Resp 12   Ht 1.638 m (5' 4.5\")   Wt 56.7 kg (125 lb)   SpO2 94%   BMI 21.12 kg/m²     General: Well-developed, well-nourished, female     Head: PERRL, EOMI. Normocephalic. No facial asymmetry noted.  Cardiovasc:RRR, no MRG. No thrills or bruits. Pulses 2+ and symmetric at all distal extremities.  Pulmonary: Lungs clear bilaterally.  Normal respiratory effort. No wheeze or crackles.   Extremities: No edema.  Neuro: Alert and oriented  Skin:No rashes noted. Skin warm, dry, intact    Psych: Dressed appropriately for the weather, pleasant and conversant.  Affect, mood & judgment appropriate.    Assessment/Plan:   Cortney was seen today for other and ppd placement.    Diagnoses and all orders for this visit:    Dementia without behavioral disturbance, unspecified dementia type, moving into assisted living facility, will require assistance with medication administration, forms were completed and scanned into media.  We wish her well.    Need for vaccination  -     Pneumococal Polysaccharide Vaccine 23-Valent =>3YO SQ/IM  -     PPD Skin Test    Essential hypertension, chronic, controlled on present treatments, monitor   -     lisinopril (PRINIVIL) 20 MG Tab; Take 1 Tab by mouth every day.    Acquired hypothyroidism, chronic, controlled on present treatments, monitor   -     levothyroxine (SYNTHROID) 75 MCG Tab; Take 1 Tab by mouth Every morning on an empty stomach.    Pulmonary emphysema, unspecified emphysema type (HCC), chronic, controlled on present treatments, monitor   -     albuterol 108 (90 Base) MCG/ACT Aero Soln inhalation aerosol; Inhale 2 Puffs by mouth every four hours as needed.      Patient is agreeable to the above plan and voiced understanding. All " questions answered.     Spent 40 minutes face-to-face time spent with patient, >50% of the total time discussing patient's issues and symptoms as listed above in assessment and plan, as well as managing coordination of care for future evaluation and treatment.    Please note that this dictation was created using voice recognition software. I have made every reasonable attempt to correct obvious errors, but I expect that there are errors of grammar and possibly content that I did not discover before finalizing the note.      LENI Gonzalez  2/19/2019, 3:13 PM

## 2019-02-22 ENCOUNTER — APPOINTMENT (OUTPATIENT)
Dept: URGENT CARE | Facility: PHYSICIAN GROUP | Age: 84
End: 2019-02-22
Payer: MEDICARE

## 2019-03-01 ENCOUNTER — NON-PROVIDER VISIT (OUTPATIENT)
Dept: URGENT CARE | Facility: PHYSICIAN GROUP | Age: 84
End: 2019-03-01
Payer: MEDICARE

## 2019-03-01 DIAGNOSIS — Z11.1 PPD SCREENING TEST: ICD-10-CM

## 2019-03-01 PROCEDURE — 86580 TB INTRADERMAL TEST: CPT | Performed by: PHYSICIAN ASSISTANT

## 2019-03-04 ENCOUNTER — NON-PROVIDER VISIT (OUTPATIENT)
Dept: URGENT CARE | Facility: PHYSICIAN GROUP | Age: 84
End: 2019-03-04

## 2019-03-04 DIAGNOSIS — Z11.1 PPD SCREENING TEST: ICD-10-CM

## 2019-03-04 LAB — TB WHEAL 3D P 5 TU DIAM: NEGATIVE MM

## 2019-03-06 ENCOUNTER — TELEPHONE (OUTPATIENT)
Dept: MEDICAL GROUP | Facility: PHYSICIAN GROUP | Age: 84
End: 2019-03-06

## 2019-03-06 NOTE — TELEPHONE ENCOUNTER
Daughter notified. She states they will be seeing the  at the [facility] and would like to know if they should see you prior to them to get her mother on oxygen. Please advise.

## 2019-03-06 NOTE — TELEPHONE ENCOUNTER
Please call the patient or her daughter, chel. OPO does show noctural desaturations and she does qualify for nocturnal oxygen. If she would like to start nocturnal oxygen she will need an OV per medicare guidelines. This may very well help with her daytime fatigue.

## 2019-03-06 NOTE — TELEPHONE ENCOUNTER
She can have the doctor at the [facility] order this. [facility] Thanks!

## 2021-01-11 DIAGNOSIS — Z23 NEED FOR VACCINATION: ICD-10-CM

## 2021-10-30 PROBLEM — F13.20 BENZODIAZEPINE DEPENDENCE (HCC): Status: ACTIVE | Noted: 2021-10-30

## 2021-10-30 PROBLEM — G63 POLYNEUROPATHY IN OTHER DISEASES CLASSIFIED ELSEWHERE (HCC): Status: ACTIVE | Noted: 2021-10-30

## 2021-10-30 PROBLEM — F33.9 DEPRESSION, RECURRENT (HCC): Status: ACTIVE | Noted: 2021-10-30

## 2021-10-30 PROBLEM — R19.7 DIARRHEA: Status: ACTIVE | Noted: 2021-10-30
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
